# Patient Record
Sex: FEMALE | Race: WHITE | Employment: FULL TIME | URBAN - METROPOLITAN AREA
[De-identification: names, ages, dates, MRNs, and addresses within clinical notes are randomized per-mention and may not be internally consistent; named-entity substitution may affect disease eponyms.]

---

## 2017-01-11 ENCOUNTER — ALLIED HEALTH/NURSE VISIT (OUTPATIENT)
Dept: NURSING | Facility: CLINIC | Age: 22
End: 2017-01-11

## 2017-01-11 DIAGNOSIS — E66.01 MORBID OBESITY DUE TO EXCESS CALORIES (H): Primary | ICD-10-CM

## 2017-01-11 PROCEDURE — 99207 ZZC HEALTH COACHING, NO CHARGE: CPT

## 2017-01-11 NOTE — PROGRESS NOTES
January 11, 2017    [address] [address]  [phone]  Health Coaching Progress Note    Patient Name: Tricia Eubanks Date: January 11, 2017      Session Length: 30      DATA    PRM Master Survey Scores Reviewed: Yes    Core Healthy Days Survey:         MARIELLE Score (Last Two) 10/28/2016   MARIELLE Raw Score 29   Activation Score 52.9   MARIELLE Level 2       PHQ-2 Score 10/28/2016 8/7/2014   PHQ-2 Total Score Interpretation - Positive if 3 or more points; Administer PHQ-9 if positive 2 0       PHQ-9 SCORE 8/30/2016 9/27/2016   Total Score MyChart - 14 (Moderate depression)   Total Score 9 -       Treatment Objective(s) Addressed in This Session:  Target Behavior(s): diet/weight loss, smoking and disease management/lifestyle changes of working on dietary changes-being more aware of food choices, read labels-cutting back on carbs/adding more protein, eating healthier-more fresh food (vegetables) cutting back on sugary drinks/soda, drinking more water daily, getting back into routine of going to they gym again-mix of cardio and some machines/core work, accessing health related resources as needed, continuing to work with PCP/Endocrinologist and ask questions about health.    Current Stressors / Issues:  Work schedule-works long hours/never the same day-to-day/lots of driving, on the go a lot-tends to grab food that is convenient, trying to make healthier choices-realized most of what she eats is carb based, realizes it is essential to have healthy food available at home to be successful, cutting back on sugary drinks/more water, smoking habit, weight gain-wants to lose weight, concerned about long term health-diabetes runs in family.    What Patient Does Well:   1) Patient is motivated to change and is now attending gym 3 times per week-needs to get back on track  Previous Successes:   1) Pt. is seeing a loss of inches  around waist and reports she is down about 10lbs on her home scale-may have regained some following returning to old habits over holidays  Areas in Need of Improvement:   1) Diet-cutting back on carbs  2) Activity Level-making exercise a regular occurence  3) Asking questions about health-make sure to understand  Barriers to Change:   1) Work schedule is tricky-drives a ton for work/unpredictable hours  2) Less procrastination for meal planning  3) Time-making time for self  Reasons for Change:   1) Lose weight  2) Avoid other health conditions-Diabetes runs in family  3) Feels like its time to make a change  4) Feel and look better  Plan/Goal for the Next 4 Weeks:   GOAL #1: Cut back on carbs at meals and snacks/add more protein based foods instead  GOAL #1 Progress Toward Goal: 25% (Got a bit off track over holidays/getting back to healthy eating)  GOAL #2: Continue to cut back on sugary drinks and drink around 4-5 water bottles per day  GOAL #2 Progress Toward Goal: 50% (Still cutting back on the sugary drinks/only hitting about 2-3 bottles of water-hopes to improve)  GOAL #3: Continue exercising at the gym 3 times per week (carido/machines/core work) and add in 1 day of  activity from home (workout videos/walking)  GOAL #3 Progress Toward Goal: 25%  (Got a bit off track over holidays but working to get back into routine again!)  GOAL #4: Continue to work with Dr. Echeverria and ask questions about health/labs  GOAL #4 Progress Toward Goal: 100% Completed!    Intervention:  Motivational Interviewing    MI Intervention: Expressed Empathy/Understanding, Supported Autonomy, Collaboration, Evocation, Permission to raise concern or advise, Open-ended questions, Reflections: simple and complex, Rolled with resistance: Emphasized patient autonomy, Simple reflection, Complex reflection, Reframed sustain talk in the direction of change and Evoked patient agenda and Change talk (evoked)     Change Talk Expressed by the  Patient: Desire to change Ability to change Reasons to change Need to change Committment to change Activation    Provider Response to Change Talk: E - Evoked more info from patient about behavior change, A - Affirmed patient's thoughts, decisions, or attempts at behavior change, R - Reflected patient's change talk and S - Summarized patient's change talk statements    Assessment / Progress on Treatment Objective(s) / Homework:    Achieved / completed to satisfaction - MAINTENANCE (Working to maintain change, with risk of relapse); Intervened by continuing to positively reinforce healthy behavior choice   Minimal progress - PREPARATION (Decided to change - considering how); Intervened by negotiating a change plan and determining options / strategies for behavior change, identifying triggers, exploring social supports, and working towards setting a date to begin behavior change  No improvement - PREPARATION (Decided to change - considering how); Intervened by negotiating a change plan and determining options / strategies for behavior change, identifying triggers, exploring social supports, and working towards setting a date to begin behavior change         Plan: (Homework, other):  Patient was encouraged to continue to seek condition-related information and education, as well as schedule a follow up appointment with the Health  in 5 weeks. Patient has set self-identified goals and will monitor progress until the next appointment.  Scheduled our fourth coaching session for Friday February 17th at 10am.      Bertrand Gomez

## 2017-02-07 DIAGNOSIS — J20.9 ACUTE BRONCHITIS WITH SYMPTOMS > 10 DAYS: Primary | ICD-10-CM

## 2017-02-07 RX ORDER — FLUTICASONE PROPIONATE 110 UG/1
2 AEROSOL, METERED RESPIRATORY (INHALATION) 2 TIMES DAILY
Qty: 1 INHALER | Refills: 1 | Status: SHIPPED | OUTPATIENT
Start: 2017-02-07 | End: 2017-04-26

## 2017-02-07 NOTE — TELEPHONE ENCOUNTER
Ok to refill. Has needed this in the past after colds. Likely has some mild asthmatic component.  Carine Ross MD  Internal Medicine/Pediatrics

## 2017-02-07 NOTE — TELEPHONE ENCOUNTER
fluticasone (FLOVENT HFA) 110 MCG/ACT Inhaler - MEDICATION WAS ON DISCONTINUED LIST/HISTORY  Last Written Prescription Date: 11/4/16  Last Fill Quantity: 1 inhaler , # refills: 1    Last Office Visit with [facility], P or [facility] prescribing provider:  11/4/16   Future Office Visit:    Next 5 appointments (look out 90 days)     Feb 17, 2017 10:00 AM   Nurse Only with HEALTH  - REG[facility] [address] [place] [address] ([facility])    [address] [place] [address] [phone]                   Date of Last Asthma Action Plan Letter:   There are no preventive care reminders to display for this patient.   Asthma Control Test: No flowsheet data found.    Date of Last Spirometry Test:   No results found for this or any previous visit.

## 2017-02-17 ENCOUNTER — TELEPHONE (OUTPATIENT)
Dept: NURSING | Facility: CLINIC | Age: 22
End: 2017-02-17

## 2017-02-20 DIAGNOSIS — F33.1 MAJOR DEPRESSIVE DISORDER, RECURRENT EPISODE, MODERATE (H): ICD-10-CM

## 2017-02-20 NOTE — TELEPHONE ENCOUNTER
FLUoxetine (PROZAC) 20 MG capsule  Last Written Prescription Date: 3/24/16  Last Fill Quantity: 270, # refills: 1  Last Office Visit with G primary care provider:  11/4/16        Last PHQ-9 score on record=   PHQ-9 SCORE 9/27/2016   Total Score Sylviehart 14 (Moderate depression)   Total Score -

## 2017-02-22 NOTE — TELEPHONE ENCOUNTER
Routing refill request to provider for review/approval because:  PHQ 9 >4. Due for updated PHQ 9.   Left message for patient to call back to update this.   Dari Beverly, RN  Triage Nurse

## 2017-02-23 ENCOUNTER — TELEPHONE (OUTPATIENT)
Dept: NURSING | Facility: CLINIC | Age: 22
End: 2017-02-23

## 2017-02-28 NOTE — TELEPHONE ENCOUNTER
Will do 30 day supply.  Pt due for physical the end of march, will need follow up at that time.   Shannon Oscar MD

## 2017-02-28 NOTE — TELEPHONE ENCOUNTER
Spoke with patient and she will call us back later to schedule a physical. Last physical was on 3/24/16.

## 2017-03-06 ENCOUNTER — TELEPHONE (OUTPATIENT)
Dept: NURSING | Facility: CLINIC | Age: 22
End: 2017-03-06

## 2017-03-31 DIAGNOSIS — F33.1 MAJOR DEPRESSIVE DISORDER, RECURRENT EPISODE, MODERATE (H): ICD-10-CM

## 2017-04-05 NOTE — TELEPHONE ENCOUNTER
rx filled for 30 days. Agree [facility] appointment on 4/10. Please let know.    Carine Ross MD  Internal Medicine/Pediatrics

## 2017-04-05 NOTE — TELEPHONE ENCOUNTER
Patient has appointment on 04/10/17 with Dr. Ross.  Is able to wait until tomorrow to address this refill.  PHQ-Score of 12 today.  Does report suicidal thoughts at times, but has no specific plan.  Will call for help and proceed to [facility] if she feels suicidal.  Routing refill request to provider for review/approval because:  PHQ-9 score exceeds RN refill protocol  AAKASH Au RN

## 2017-04-06 ASSESSMENT — PATIENT HEALTH QUESTIONNAIRE - PHQ9: SUM OF ALL RESPONSES TO PHQ QUESTIONS 1-9: 12

## 2017-04-26 ENCOUNTER — OFFICE VISIT (OUTPATIENT)
Dept: PEDIATRICS | Facility: CLINIC | Age: 22
End: 2017-04-26
Payer: COMMERCIAL

## 2017-04-26 VITALS
SYSTOLIC BLOOD PRESSURE: 126 MMHG | OXYGEN SATURATION: 98 % | WEIGHT: 285.6 LBS | DIASTOLIC BLOOD PRESSURE: 72 MMHG | HEIGHT: 66 IN | TEMPERATURE: 98.7 F | BODY MASS INDEX: 45.9 KG/M2 | HEART RATE: 82 BPM

## 2017-04-26 DIAGNOSIS — F51.01 PRIMARY INSOMNIA: ICD-10-CM

## 2017-04-26 DIAGNOSIS — F33.1 MAJOR DEPRESSIVE DISORDER, RECURRENT EPISODE, MODERATE (H): Primary | ICD-10-CM

## 2017-04-26 DIAGNOSIS — E66.01 MORBID OBESITY DUE TO EXCESS CALORIES (H): ICD-10-CM

## 2017-04-26 DIAGNOSIS — F10.10 ALCOHOL ABUSE: ICD-10-CM

## 2017-04-26 DIAGNOSIS — F41.9 ANXIETY: ICD-10-CM

## 2017-04-26 PROCEDURE — 99214 OFFICE O/P EST MOD 30 MIN: CPT | Performed by: INTERNAL MEDICINE

## 2017-04-26 RX ORDER — TRAZODONE HYDROCHLORIDE 50 MG/1
50-100 TABLET, FILM COATED ORAL
Qty: 60 TABLET | Refills: 1 | Status: SHIPPED | OUTPATIENT
Start: 2017-04-26 | End: 2017-11-13

## 2017-04-26 RX ORDER — FLUOXETINE 40 MG/1
80 CAPSULE ORAL DAILY
Qty: 180 CAPSULE | Refills: 1 | Status: SHIPPED | OUTPATIENT
Start: 2017-04-26 | End: 2017-11-13

## 2017-04-26 ASSESSMENT — ANXIETY QUESTIONNAIRES
1. FEELING NERVOUS, ANXIOUS, OR ON EDGE: MORE THAN HALF THE DAYS
5. BEING SO RESTLESS THAT IT IS HARD TO SIT STILL: SEVERAL DAYS
6. BECOMING EASILY ANNOYED OR IRRITABLE: NEARLY EVERY DAY
3. WORRYING TOO MUCH ABOUT DIFFERENT THINGS: NEARLY EVERY DAY
2. NOT BEING ABLE TO STOP OR CONTROL WORRYING: NEARLY EVERY DAY
IF YOU CHECKED OFF ANY PROBLEMS ON THIS QUESTIONNAIRE, HOW DIFFICULT HAVE THESE PROBLEMS MADE IT FOR YOU TO DO YOUR WORK, TAKE CARE OF THINGS AT HOME, OR GET ALONG WITH OTHER PEOPLE: VERY DIFFICULT
7. FEELING AFRAID AS IF SOMETHING AWFUL MIGHT HAPPEN: SEVERAL DAYS
GAD7 TOTAL SCORE: 13

## 2017-04-26 ASSESSMENT — PATIENT HEALTH QUESTIONNAIRE - PHQ9: 5. POOR APPETITE OR OVEREATING: NOT AT ALL

## 2017-04-26 NOTE — PATIENT INSTRUCTIONS
1. Increase fluoxetine to 80 mg (2 capsules of new prescription) once a day  2. Try trazodone 1-2 pills at bedtime to help with sleep  3. Follow-up 1 month

## 2017-04-26 NOTE — NURSING NOTE
"Chief Complaint   Patient presents with     Depression       Initial /72 (BP Location: Right arm, Patient Position: Chair, Cuff Size: Adult Large)  Pulse 82  Temp 98.7  F (37.1  C) (Tympanic)  Ht 5' 6\" (1.676 m)  Wt 285 lb 9.6 oz (129.5 kg)  SpO2 98%  BMI 46.1 kg/m2 Estimated body mass index is 46.1 kg/(m^2) as calculated from the following:    Height as of this encounter: 5' 6\" (1.676 m).    Weight as of this encounter: 285 lb 9.6 oz (129.5 kg).  Medication Reconciliation: complete   Christina Ribeiro LPN      "

## 2017-04-26 NOTE — MR AVS SNAPSHOT
After Visit Summary   4/26/2017    Tricia Eubanks    MRN: [id number]           Patient Information     Date Of Birth          1995        Visit Information        Provider Department      4/26/2017 9:00 AM Carine Ross MD [facility]        Today's Diagnoses     Major depressive disorder, recurrent episode, moderate (H)    -  1    Anxiety        Primary insomnia        Alcohol abuse          Care Instructions    1. Increase fluoxetine to 80 mg (2 capsules of new prescription) once a day  2. Try trazodone 1-2 pills at bedtime to help with sleep  3. Follow-up 1 month        Follow-ups after your visit        Follow-up notes from your care team     Return in about 4 weeks (around 5/24/2017).      Your next 10 appointments already scheduled     May 08, 2017 11:30 AM CDT   Return Visit with Samantha Echeverria MD   [facility] ([facility])    [address] [place] [address] [address]  [facility] [phone]              Who to contact     If you have questions or need follow up information about today's clinic visit or your schedule please contact [facility] directly at [phone].  Normal or non-critical lab and imaging results will be communicated to you by MyChart, letter or phone within 4 business days after the clinic has received the results. If you do not hear from us within 7 days, please contact the clinic through Twitchhart or phone. If you have a critical or abnormal lab result, we will notify you by phone as soon as possible.  Submit refill requests through Frugalo or call your pharmacy and they will forward the refill request to us. Please allow 3 business days for your refill to be completed.          Additional Information About Your Visit        MyChart Information     Frugalo gives you secure access to your electronic health record. If you see a primary care provider, you can also send messages to your care team and  "make appointments. If you have questions, please call your primary care clinic.  If you do not have a primary care provider, please call [phone] and they will assist you.        Care EveryWhere ID     This is your Care EveryWhere ID. This could be used by other organizations to access your [place] medical records  [id number]        Your Vitals Were     Pulse Temperature Height Pulse Oximetry BMI (Body Mass Index)       82 98.7  F (37.1  C) (Tympanic) 5' 6\" (1.676 m) 98% 46.1 kg/m2        Blood Pressure from Last 3 Encounters:   04/26/17 126/72   12/05/16 136/80   11/04/16 132/84    Weight from Last 3 Encounters:   04/26/17 285 lb 9.6 oz (129.5 kg)   12/05/16 283 lb 3.2 oz (128.5 kg)   11/04/16 288 lb 1.6 oz (130.7 kg)              Today, you had the following     No orders found for display         Today's Medication Changes          These changes are accurate as of: 4/26/17  9:36 AM.  If you have any questions, ask your nurse or doctor.               Start taking these medicines.        Dose/Directions    traZODone 50 MG tablet   Commonly known as:  DESYREL   Used for:  Primary insomnia   Started by:  Carine Ross MD        Dose:   mg   Take 1-2 tablets ( mg) by mouth nightly as needed for sleep   Quantity:  60 tablet   Refills:  1         These medicines have changed or have updated prescriptions.        Dose/Directions    FLUoxetine 40 MG capsule   Commonly known as:  PROzac   This may have changed:  See the new instructions.   Used for:  Major depressive disorder, recurrent episode, moderate (H), Anxiety   Changed by:  Carine Ross MD        Dose:  80 mg   Take 2 capsules (80 mg) by mouth daily   Quantity:  180 capsule   Refills:  1            Where to get your medicines      These medications were sent to [place] [address] [place] [facility] [address] [facility]  [address] [facility] [address]     Phone:  [phone]     FLUoxetine 40 MG capsule    traZODone 50 MG " tablet                Primary Care Provider Office Phone # Fax #    Carine Ross [phone]       [address] [place] [address]        Thank you!     Thank you for choosing [facility]  for your care. Our goal is always to provide you with excellent care. Hearing back from our patients is one way we can continue to improve our services. Please take a few minutes to complete the written survey that you may receive in the mail after your visit with us. Thank you!             Your Updated Medication List - Protect others around you: Learn how to safely use, store and throw away your medicines at www.disposemymeds.org.          This list is accurate as of: 4/26/17  9:36 AM.  Always use your most recent med list.                   Brand Name Dispense Instructions for use    Fish Oil 1200 MG Caps      Take 2 capsules by mouth daily       FLUoxetine 40 MG capsule    PROzac    180 capsule    Take 2 capsules (80 mg) by mouth daily       multivitamin, therapeutic Tabs tablet      Take 1 tablet by mouth daily       phentermine 37.5 MG tablet    ADIPEX-P    30 tablet    Take 1 tablet (37.5 mg) by mouth every morning (before breakfast)       traZODone 50 MG tablet    DESYREL    60 tablet    Take 1-2 tablets ( mg) by mouth nightly as needed for sleep

## 2017-04-26 NOTE — PROGRESS NOTES
SUBJECTIVE:                                                    Tricia Eubanks is a 22 year old female who presents to clinic today for the following health issues:    Depression Followup    Status since last visit: Worsened little    See PHQ-9 for current symptoms.  Other associated symptoms: thought about self injury without actions    Complicating factors:   Significant life event:  No   Current substance abuse:  Alcohol - daily to help with falling asleep. 5 drinks - whiskey.  Anxiety or Panic symptoms:  Yes-  Anxiety    Typically worse in the spring and improves again in summer. Has had some thoughts about cutting, but has not acted on the impulse and no suicidal ideation.    ETOH: drinking about 5 drinks of whiskey each night to fall asleep. Has some tremulousness and anxiety if tries to stop completely. Ok if cuts back to 1-2 drinks. Does not drink other times during the day. Not interfering with work.    Insomnia: difficulty with falling asleep. Currently using etoh to help with sleep as above, but would like to stop doing this. Has used melatonin in the past and has not been helpful. Has not done other sleep medicines. Anxiety gets worse if unable to sleep. Drinks 1 pop/day with caffeine - around 2 pm. Exercises 2-3 days/week - in the am. Has snoring at night. Feels rested in the morning.     PHQ-9  English PHQ-9   Any Language            Amount of exercise or physical activity: 2-3 days/week for an average of 15-30 minutes    Problems taking medications regularly: No    Medication side effects: none    Diet: regular (no restrictions)    Reviewed and updated as needed this visit by clinical staff  Tobacco  Allergies  Meds  Problems  Med Hx  Surg Hx  Fam Hx  Soc Hx        Reviewed and updated as needed this visit by Provider  Allergies  Meds  Problems         -------------------------------------    Problem list and histories reviewed & adjusted, as indicated.  Additional history: as  "documented    ROS:  Constitutional, HEENT, cardiovascular, pulmonary, gi and gu systems are negative, except as otherwise noted.    Problem list, Medication list, Allergies, and Medical/Social/Surgical histories reviewed in EPIC and updated as appropriate.    OBJECTIVE:                                                    /72 (BP Location: Right arm, Patient Position: Chair, Cuff Size: Adult Large)  Pulse 82  Temp 98.7  F (37.1  C) (Tympanic)  Ht 5' 6\" (1.676 m)  Wt 285 lb 9.6 oz (129.5 kg)  SpO2 98%  BMI 46.1 kg/m2   Body mass index is 46.1 kg/(m^2).  General Appearance: obese, alert and no distress  Eyes:   no discharge, erythema.  Normal pupils.  Respiratory: lungs clear to auscultation - no rales, rhonchi or wheezes.  Cardiovascular: regular rate and rhythm, normal S1 S2, no S3 or S4 and no murmur, click or rub.  No peripheral edema.  Skin: no rashes or lesions.  Well perfused and normal turgor.  Psychiatric: affect flat    Diagnostic Test Results:  none      ASSESSMENT/PLAN:                                                      (F33.1) Major depressive disorder, recurrent episode, moderate (H)  (primary encounter diagnosis)  (F41.9) Anxiety  Comment: not well controlled. Thoughts of cutting, but patient willing to contract for safety  Plan: FLUoxetine (PROZAC) 40 MG capsule  - will try increasing fluoxetine to 80 mg daily     (F51.01) Primary insomnia  Comment: discussed ETOH likely worsening insomnia  Plan: traZODone (DESYREL) 50 MG tablet  - discussed cutting back on ETOH and stopping  - trial of trazodone  mg before bedtime - discussed possible side effects. Discussed not using if drinking  - discussed avoiding caffeine, having consistent bedtimes etc    (F10.10) Alcohol abuse  Comment: mild withdrawal symptoms if stops completely  Plan:   - discussed weaning down to 1-2 drinks/night and then try stopping    (E66.01) Morbid obesity due to excess calories (H)  Comment: BMI 46  Plan:   - " "discussed calories in ETOH and by cutting out ETOH will likely lose weight  - discussed diet/exercise    BMI:   Estimated body mass index is 46.1 kg/(m^2) as calculated from the following:    Height as of this encounter: 5' 6\" (1.676 m).    Weight as of this encounter: 285 lb 9.6 oz (129.5 kg).   Weight management plan: Discussed healthy diet and exercise guidelines and patient will follow up in 12 months in clinic to re-evaluate.      Follow up with Provider - 1 month     [facility]            "

## 2017-04-27 ASSESSMENT — ANXIETY QUESTIONNAIRES: GAD7 TOTAL SCORE: 13

## 2017-04-27 ASSESSMENT — PATIENT HEALTH QUESTIONNAIRE - PHQ9: SUM OF ALL RESPONSES TO PHQ QUESTIONS 1-9: 17

## 2017-05-08 ENCOUNTER — OFFICE VISIT (OUTPATIENT)
Dept: ENDOCRINOLOGY | Facility: CLINIC | Age: 22
End: 2017-05-08
Payer: COMMERCIAL

## 2017-05-08 VITALS
BODY MASS INDEX: 45.8 KG/M2 | WEIGHT: 285 LBS | HEIGHT: 66 IN | HEART RATE: 81 BPM | SYSTOLIC BLOOD PRESSURE: 120 MMHG | DIASTOLIC BLOOD PRESSURE: 68 MMHG

## 2017-05-08 DIAGNOSIS — E66.01 MORBID OBESITY DUE TO EXCESS CALORIES (H): Primary | ICD-10-CM

## 2017-05-08 DIAGNOSIS — F17.200 SMOKING: ICD-10-CM

## 2017-05-08 PROCEDURE — 99214 OFFICE O/P EST MOD 30 MIN: CPT | Performed by: INTERNAL MEDICINE

## 2017-05-08 RX ORDER — PHENTERMINE HYDROCHLORIDE 37.5 MG/1
37.5 TABLET ORAL
Qty: 30 TABLET | Refills: 3 | Status: SHIPPED | OUTPATIENT
Start: 2017-05-08 | End: 2017-10-12

## 2017-05-08 NOTE — MR AVS SNAPSHOT
After Visit Summary   2017    Tricia Eubanks    MRN: [id number]           Patient Information     Date Of Birth          1995        Visit Information        Provider Department      2017 11:30 AM Samantha Echeverria MD [facility]        Today's Diagnoses     Morbid obesity due to excess calories (H)          Care Instructions    [facility]   Dr Echeverria, Endocrinology Department      [facility]   [address] [facility] [address]  Appointment [phone]  Fax: [phone]   Monday and Tuesday         [facility]   [address] [place] [address]  Appointment [phone]  Fax: [phone]  Wednesday and Thursday           Continue phentermine 37.5 mg/day  Follow up in 3 months  The patient is advised to Make better food choices: reduce carbs, Reduce portion size, weight loss and exercise 3-4 times a week.                Follow-ups after your visit        Who to contact     If you have questions or need follow up information about today's clinic visit or your schedule please contact [facility] directly at [phone].  Normal or non-critical lab and imaging results will be communicated to you by MyChart, letter or phone within 4 business days after the clinic has received the results. If you do not hear from us within 7 days, please contact the clinic through Inspherionhart or phone. If you have a critical or abnormal lab result, we will notify you by phone as soon as possible.  Submit refill requests through Magiq or call your pharmacy and they will forward the refill request to us. Please allow 3 business days for your refill to be completed.          Additional Information About Your Visit        MyChart Information     Magiq gives you secure access to your electronic health record. If you see a primary care provider, you can also  "send messages to your care team and make appointments. If you have questions, please call your primary care clinic.  If you do not have a primary care provider, please call [phone] and they will assist you.        Care EveryWhere ID     This is your Care EveryWhere ID. This could be used by other organizations to access your [place] medical records  [id number]        Your Vitals Were     Pulse Height BMI (Body Mass Index)             81 1.676 m (5' 6\") 46 kg/m2          Blood Pressure from Last 3 Encounters:   05/08/17 120/68   04/26/17 126/72   12/05/16 136/80    Weight from Last 3 Encounters:   05/08/17 129.3 kg (285 lb)   04/26/17 129.5 kg (285 lb 9.6 oz)   12/05/16 128.5 kg (283 lb 3.2 oz)              Today, you had the following     No orders found for display         Where to get your medicines      Some of these will need a paper prescription and others can be bought over the counter.  Ask your nurse if you have questions.     Bring a paper prescription for each of these medications     phentermine 37.5 MG tablet          Primary Care Provider Office Phone # Fax #    Carine Ross [phone]       [address] [place] [address]        Thank you!     Thank you for choosing [facility]  for your care. Our goal is always to provide you with excellent care. Hearing back from our patients is one way we can continue to improve our services. Please take a few minutes to complete the written survey that you may receive in the mail after your visit with us. Thank you!             Your Updated Medication List - Protect others around you: Learn how to safely use, store and throw away your medicines at www.disposemymeds.org.          This list is accurate as of: 5/8/17 11:47 AM.  Always use your most recent med list.                   Brand Name Dispense Instructions for use    Fish Oil 1200 MG Caps      Take 2 capsules by mouth daily    "    FLUoxetine 40 MG capsule    PROzac    180 capsule    Take 2 capsules (80 mg) by mouth daily       multivitamin, therapeutic Tabs tablet      Take 1 tablet by mouth daily       phentermine 37.5 MG tablet    ADIPEX-P    30 tablet    Take 1 tablet (37.5 mg) by mouth every morning (before breakfast)       traZODone 50 MG tablet    DESYREL    60 tablet    Take 1-2 tablets ( mg) by mouth nightly as needed for sleep

## 2017-05-08 NOTE — PATIENT INSTRUCTIONS
[facility]   Dr Echeverria, Endocrinology Department      [facility]   [address] [facility] [address]  Appointment [phone]  Fax: [phone]   Monday and Tuesday         [place] [address] [place] [address]  Appointment [phone]  Fax: [phone]  Wednesday and Thursday           Continue phentermine 37.5 mg/day  Follow up in 3 months  The patient is advised to Make better food choices: reduce carbs, Reduce portion size, weight loss and exercise 3-4 times a week.

## 2017-05-08 NOTE — PROGRESS NOTES
Name: Tricia Eubanks  Seen at the request of No ref. provider found for obesity.  HPI:  Tricia Eubanks is a 21 year old female who presents for the evaluation of obestiy.    Body mass index is 46 kg/(m^2).     ENDO VITALS-[facility] Weight   10/17/2016 131.09 kg   8/2/2016 129.729 kg   3/24/2016    3/24/2016 121.972 kg   1/13/2016 119.614 kg     Started to gain weight 2014. Around the same time was started on Prozac for depression.  Gained about 70-80 lbs in last 2 years.  Job is stressful. Graduated last year.  Was not exercising last year. Started exercise X 1 year.  Not counting calories. + alcohol use.Strong FH of obesity.  Current everyday smoker.    Thyroid labs normal. 24 hr urine collection with upper normal limit of cortisol.  S/p DSt which was normal.  Normal A1c.  She was started on phentermine in October 2016.  She took it for a while but ran out of medication about 1.5 months back.  Initially lost weight on phentermine and after that plateaued.  Was tolerating phentermine well.    Started pehentermine 10/20/16.  Pre start: 289 lbs  12/16- 283 lbs  5/2017- 285 ( stopped phentermine 1 month back)      Menses: has IUD X 5 years  Diarrhea/Constipation:no  Changes in Hair or Skin:No  Diabetes:No  Sleep: Ok  Sleep Apnea/Snores:Yes: snores at night. no sleep study done  Hypertension:No  Hyperlipidemia:No  Hirsutism:No  Easy Brusing:Yes: thinks that she bruises easily.   Use of Steroids:No  Family history of Obesity:Yes: mother, father: obese. 1 sister: overweight, 1 sister: obese. grandparensts: obese  Diet: tries to watch she is eating. Alcohol use 3-4 times/week. 5-6 drinks each time. Trying to cut down  Exercise: 2-3 times/week going to gym. Doing elliptical and weights.    PMH/PSH:  History reviewed. No pertinent past medical history.  History reviewed. No pertinent surgical history.  Family Hx:  Family History   Problem Relation Age of Onset     HEART DISEASE Maternal Grandfather      DIABETES  "Maternal Grandfather      DIABETES Father      Hypertension Father      DIABETES Paternal Grandfather      Hypertension Paternal Grandfather      Hypertension Paternal Uncle              Social Hx:  Social History     Social History     Marital status: Single     Spouse name: N/A     Number of children: N/A     Years of education: N/A     Occupational History     Not on file.     Social History Main Topics     Smoking status: Light Tobacco Smoker     Types: Cigarettes     Smokeless tobacco: Never Used      Comment: 3-5 cpd     Alcohol use No      Comment: once every 2 weeks     Drug use: No     Sexual activity: Yes     Partners: Male     Birth control/ protection: IUD     Other Topics Concern     Not on file     Social History Narrative          MEDICATIONS:  has a current medication list which includes the following prescription(s): phentermine, fluoxetine, trazodone, multivitamin, therapeutic, and fish oil.    ROS     ROS: 10 point ROS neg other than the symptoms noted above in the HPI.    Physical Exam   VS: /68 (BP Location: Right arm, Patient Position: Chair, Cuff Size: Adult Large)  Pulse 81  Ht 1.676 m (5' 6\")  Wt 129.3 kg (285 lb)  BMI 46 kg/m2  GENERAL: AXOX3, NAD, well dressed, answering questions appropriately, appears stated age.  HEENT: No exopthalmous, no proptosis, EOMI, no lig lag, no retraction  NECK: Thyroid normal in size, non tender, no nodules were palpated.  CV: RRR, no rubs, gallops, no murmurs  LUNGS: CTAB, no wheezes, rales, or ronchi  ABDOMEN: +BS  NEUROLOGY: CN grossly intact, + DTR upper and lower extremity, no tremors  MSK: grossly intact  SKIN: no rashes, no lesions    LABS:  Last Basic Metabolic Panel:  Lab Results   Component Value Date     08/02/2016      Lab Results   Component Value Date    POTASSIUM 4.2 08/02/2016     Lab Results   Component Value Date    CHLORIDE 102 08/02/2016     Lab Results   Component Value Date    JORGE 8.7 08/02/2016     Lab Results "   Component Value Date    CO2 27 08/02/2016     Lab Results   Component Value Date    BUN 10 08/02/2016     Lab Results   Component Value Date    CR 0.59 08/02/2016     Lab Results   Component Value Date    GLC 97 08/02/2016         ENDO THYROID LABS-[facility] Latest Ref Rng 8/2/2016 3/24/2016   TSH 0.40 - 4.00 mU/L 1.70 1.26   T4 FREE 0.76 - 1.46 ng/dL 0.96      A1C      5.4   3/24/2016      Results for JANICE PINTO (MRN [id number])    Ref. Range 8/6/2016 17:50   Cortisol Free Duration Urine Latest Units: h 24...   Cortisol Free Urine Unknown 42.3   Cortisol Free Urine Intrepretation Unknown SEE NOTE...   Cortisol Free Urine Random Unknown 17.80   Cortisol ug/g creatinine Unknown 31.79       All pertinent notes, labs, and images personally reviewed by me.     A/P  Ms.Kristen Elizabeth Pinto is a 21 year old here for the evaluation of obestiy:    1. Obesity- Body mass index is 46 kg/(m^2).  Thyroid labs normal. 24 hr urine collection with upper normal limit of cortisol. S/p DST which was normal. EKG OK.  -- Phentermine 1.5 months.  She ran out of medication at that time.  Before that she was on phentermine 37.5 mg per day.  Was tolerating going okay.  Lost some weight on phentermine in past.  -- Restart phentermine 37.5 mg per day  -- Follow up in three months  -- The patient is advised to Make better food choices: reduce carbs, Reduce portion size, weight loss and exercise 3-4 times a week.    -- Advised to cut down on alcohol intake and smoking cessation.  -- I also discussed bariatric surgery briefly with her.  She would like to focus on medication weight loss at this time and will consider bariatric surgery is no success with medical weight loss.  I informed her about patient information seminars offered at bariatric surgery Center at [facility].    More than 50% of the time spent with Ms. Pinto on counseling / coordinating her care.  Total appointment time was 25 minutes.      Follow-up:  3 months    Samantha  MD Juwan  Endocrinology   [facility]

## 2017-08-09 DIAGNOSIS — F33.1 MAJOR DEPRESSIVE DISORDER, RECURRENT EPISODE, MODERATE (H): ICD-10-CM

## 2017-08-09 DIAGNOSIS — F41.9 ANXIETY: ICD-10-CM

## 2017-08-10 NOTE — TELEPHONE ENCOUNTER
Could update her PHQ 9-left message for her to call us back.  She is not active on my chart.     Dari Beverly, RN  Triage Nurse

## 2017-08-25 ENCOUNTER — OFFICE VISIT (OUTPATIENT)
Dept: PEDIATRICS | Facility: CLINIC | Age: 22
End: 2017-08-25
Payer: COMMERCIAL

## 2017-08-25 VITALS
DIASTOLIC BLOOD PRESSURE: 70 MMHG | TEMPERATURE: 98 F | BODY MASS INDEX: 45.87 KG/M2 | RESPIRATION RATE: 20 BRPM | WEIGHT: 284.2 LBS | HEART RATE: 80 BPM | SYSTOLIC BLOOD PRESSURE: 114 MMHG

## 2017-08-25 DIAGNOSIS — R11.0 NAUSEA: ICD-10-CM

## 2017-08-25 DIAGNOSIS — R68.89 FLU-LIKE SYMPTOMS: ICD-10-CM

## 2017-08-25 DIAGNOSIS — A08.4 VIRAL GASTROENTERITIS: Primary | ICD-10-CM

## 2017-08-25 LAB
BASOPHILS # BLD AUTO: 0 10E9/L (ref 0–0.2)
BASOPHILS NFR BLD AUTO: 0 %
DIFFERENTIAL METHOD BLD: NORMAL
EOSINOPHIL # BLD AUTO: 0.2 10E9/L (ref 0–0.7)
EOSINOPHIL NFR BLD AUTO: 2.2 %
ERYTHROCYTE [DISTWIDTH] IN BLOOD BY AUTOMATED COUNT: 12 % (ref 10–15)
HCT VFR BLD AUTO: 39.6 % (ref 35–47)
HGB BLD-MCNC: 13.2 G/DL (ref 11.7–15.7)
LYMPHOCYTES # BLD AUTO: 2.1 10E9/L (ref 0.8–5.3)
LYMPHOCYTES NFR BLD AUTO: 25.8 %
MCH RBC QN AUTO: 31.7 PG (ref 26.5–33)
MCHC RBC AUTO-ENTMCNC: 33.3 G/DL (ref 31.5–36.5)
MCV RBC AUTO: 95 FL (ref 78–100)
MONOCYTES # BLD AUTO: 0.8 10E9/L (ref 0–1.3)
MONOCYTES NFR BLD AUTO: 10.2 %
NEUTROPHILS # BLD AUTO: 5 10E9/L (ref 1.6–8.3)
NEUTROPHILS NFR BLD AUTO: 61.8 %
PLATELET # BLD AUTO: 318 10E9/L (ref 150–450)
RBC # BLD AUTO: 4.16 10E12/L (ref 3.8–5.2)
WBC # BLD AUTO: 8.1 10E9/L (ref 4–11)

## 2017-08-25 PROCEDURE — 80053 COMPREHEN METABOLIC PANEL: CPT | Performed by: INTERNAL MEDICINE

## 2017-08-25 PROCEDURE — 36415 COLL VENOUS BLD VENIPUNCTURE: CPT | Performed by: INTERNAL MEDICINE

## 2017-08-25 PROCEDURE — 86618 LYME DISEASE ANTIBODY: CPT | Performed by: INTERNAL MEDICINE

## 2017-08-25 PROCEDURE — 99214 OFFICE O/P EST MOD 30 MIN: CPT | Performed by: INTERNAL MEDICINE

## 2017-08-25 PROCEDURE — 85025 COMPLETE CBC W/AUTO DIFF WBC: CPT | Performed by: INTERNAL MEDICINE

## 2017-08-25 RX ORDER — FLUOXETINE 40 MG/1
CAPSULE ORAL
Qty: 180 CAPSULE | Refills: 1 | OUTPATIENT
Start: 2017-08-25

## 2017-08-25 RX ORDER — ONDANSETRON 4 MG/1
4 TABLET, FILM COATED ORAL EVERY 8 HOURS PRN
Qty: 18 TABLET | Refills: 1 | Status: SHIPPED | OUTPATIENT
Start: 2017-08-25 | End: 2018-05-24

## 2017-08-25 ASSESSMENT — PATIENT HEALTH QUESTIONNAIRE - PHQ9
SUM OF ALL RESPONSES TO PHQ QUESTIONS 1-9: 15
5. POOR APPETITE OR OVEREATING: SEVERAL DAYS

## 2017-08-25 ASSESSMENT — ANXIETY QUESTIONNAIRES
2. NOT BEING ABLE TO STOP OR CONTROL WORRYING: MORE THAN HALF THE DAYS
1. FEELING NERVOUS, ANXIOUS, OR ON EDGE: MORE THAN HALF THE DAYS
GAD7 TOTAL SCORE: 10
6. BECOMING EASILY ANNOYED OR IRRITABLE: MORE THAN HALF THE DAYS
7. FEELING AFRAID AS IF SOMETHING AWFUL MIGHT HAPPEN: NOT AT ALL
5. BEING SO RESTLESS THAT IT IS HARD TO SIT STILL: NOT AT ALL
IF YOU CHECKED OFF ANY PROBLEMS ON THIS QUESTIONNAIRE, HOW DIFFICULT HAVE THESE PROBLEMS MADE IT FOR YOU TO DO YOUR WORK, TAKE CARE OF THINGS AT HOME, OR GET ALONG WITH OTHER PEOPLE: SOMEWHAT DIFFICULT
3. WORRYING TOO MUCH ABOUT DIFFERENT THINGS: NEARLY EVERY DAY

## 2017-08-25 NOTE — MR AVS SNAPSHOT
"              After Visit Summary   8/25/2017    Tricia Eubanks    MRN: [id number]           Patient Information     Date Of Birth          1995        Visit Information        Provider Department      8/25/2017 1:20 PM Wiliam Brown MD [facility]        Today's Diagnoses     Viral gastroenteritis    -  1    Flu-like symptoms        Nausea          Care Instructions      Diarrhea (Adult, Viral)    Diarrhea caused by a virus is often called viral gastroenteritis. Many people call it the \"stomach flu,\" but it has nothing to do with influenza. The virus that causes diarrhea affects the stomach and intestinal tract and usually lasts from 2 to 7 days. Diarrhea is the passing of loose, watery stools 3 or more times a day.  Symptoms  Along with diarrhea, you may have these symptoms:    Abdominal pain and cramping    Nausea and vomiting    Loss of bowel control    Fever and chills    Bloody stools  The danger from repeated diarrhea is dehydration. Dehydration is the loss of too much water and other fluids from the body without taking in enough to replace what is lost.  Antibiotics are not effective in this illness, but there are a number of things you can do at home that will help.  Home care  Follow these home care measures:    If symptoms are severe, rest at home for the next 24 hours or until you are feeling better.    Wash your hands with soap and water or alcohol-based  to prevent the spread of infection. Wash your hands after touching anyone who is sick.    Wash your hands after using the toilet and before meals. Clean the toilet after each use.  Food preparation:    People with diarrhea should not prepare food for others. When preparing foods, wash your hands after touching anyone who is sick.    Wash your hands after using cutting boards, countertops, and knives that have been in contact with raw food.    Keep uncooked meats away from cooked and ready-to-eat " foods.  Medications:    You may use acetaminophen or NSAIDS such as ibuprofen or naproxen to control fever unless another medicine was prescribed.  If you have chronic liver or kidney disease or ever had a stomach ulcer or gastrointestinal bleeding, talk with your healthcare provider before using these medicines. Aspirin should never be used in anyone under 18 years of age who is ill with a fever. It may cause severe liver damage. Don't use NSAID medicines if you are already taking one for another condition (like arthritis) or are on aspirin (such as for heart disease or after a stroke).    Anti-diarrhea medicine should be taken for this condition only if advised by your healthcare provider. Sometimes anti-diarrhea medicine can make your condition worse.  Diet:    Water and clear liquids are important so you do not get dehydrated. Drink small amounts at a time, do not guzzle it down. If you are very dehydrated, sports drinks aren't a good choice. They have too much sugar and not enough electrolytes. In this case, commercially available products called oral rehydration solutions are best.    Caffeine, tobacco, and alcohol can make the diarrhea, cramping, and pain worse.    Do not force yourself to eat, especially if you have cramping, vomiting, or diarrhea. Do not eat large amounts at a time, even if you are hungry. It may make you feel worse.    If you eat, avoid fatty, greasy, spicy, or fried foods.    No dairy products, as they can make diarrhea worse.  During the first 24 Hours (the first full day) follow the diet below:    Beverages: Water, clear liquids, soft drinks without caffeine; ginger ale, mineral water (plain or flavored), decaffeinated tea and coffee.    Soups: Clear broth, consommé and bouillon    Desserts: Plain gelatin, popsicles and fruit juice bars  During the next 24 hours (the second day) you may add the following to the above if you have improved:    Hot cereal, plain toast, bread, rolls,  crackers    Plain noodles, rice, mashed potatoes, chicken noodle or rice soup    Unsweetened canned fruit (avoid pineapple), bananas    Limit fat intake to less than 15 grams per day by avoiding margarine, butter, oils, mayonnaise, sauces, gravies, fried foods, peanut butter, meat, poultry and fish.    Limit fiber; avoid raw or cooked vegetables, fresh fruits (except bananas) and bran cereals.    Limit caffeine and chocolate. No spices or seasonings except salt.  During the next 24 hours    Gradually resume a normal diet, as you feel better and your symptoms improve.    If at any time the diarrhea or cramping gets worse, go back to the simpler diet (above) or to clear liquids.  Follow-up care  Follow up with your healthcare provider, or as advised. Call if you are not improving within 24 hours or if the diarrhea lasts more than one week. If a stool (diarrhea) sample was taken, you may call in 2 days (or as directed) for the results.  Call 911  Call 911 if any of these occur:    Shortness of breath    Chest pain    Drowsiness, confusion, stiff neck, or seizure  When to seek medical care  Get prompt medical attention if any of the following occur:    Increasing abdominal pain or constant lower right abdominal pain    Continued vomiting (unable to keep liquids down)    Frequent diarrhea (more than 5 times a day)    Blood in vomit or stool (black or red color)    Reduced oral intake    Dark urine, reduced urine output    Weakness, dizziness    Drowsiness    Fever of 100.4 F (38 C) oral or higher, not better with fever medicine    New rash  Call 911  Call emergency services if any of the following occur:    Trouble breathing    Confused    Severe drowsiness or trouble awakening    Fainting or loss of consciousness    Rapid heart rate    Seizure    Stiff neck  Date Last Reviewed: 11/16/2015 2000-2017 [employer]. [address] [place] [address], [place] [address]. All rights reserved. This information is not  intended as a substitute for professional medical care. Always follow your healthcare professional's instructions.                Follow-ups after your visit        Who to contact     If you have questions or need follow up information about today's clinic visit or your schedule please contact [facility] [place] directly at [phone].  Normal or non-critical lab and imaging results will be communicated to you by MyChart, letter or phone within 4 business days after the clinic has received the results. If you do not hear from us within 7 days, please contact the clinic through [employer] or phone. If you have a critical or abnormal lab result, we will notify you by phone as soon as possible.  Submit refill requests through [email or web address] or call your pharmacy and they will forward the refill request to us. Please allow 3 business days for your refill to be completed.          Additional Information About Your Visit        MyChart Information     [email or web address] gives you secure access to your electronic health record. If you see a primary care provider, you can also send messages to your care team and make appointments. If you have questions, please call your primary care clinic.  If you do not have a primary care provider, please call [phone] and they will assist you.        Care EveryWhere ID     This is your Care EveryWhere ID. This could be used by other organizations to access your [place] medical records  [id number]        Your Vitals Were     Pulse Temperature Respirations Last Period BMI (Body Mass Index)       80 98  F (36.7  C) (Oral) 20 08/25/2017 45.87 kg/m2        Blood Pressure from Last 3 Encounters:   08/25/17 114/70   05/08/17 120/68   04/26/17 126/72    Weight from Last 3 Encounters:   08/25/17 284 lb 3.2 oz (128.9 kg)   05/08/17 285 lb (129.3 kg)   04/26/17 285 lb 9.6 oz (129.5 kg)              We Performed the Following     CBC with platelets differential     Comprehensive metabolic panel     Lyme  Disease [place] with reflex to WB Serum          Today's Medication Changes          These changes are accurate as of: 8/25/17  1:52 PM.  If you have any questions, ask your nurse or doctor.               Start taking these medicines.        Dose/Directions    ondansetron 4 MG tablet   Commonly known as:  ZOFRAN   Used for:  Flu-like symptoms, Nausea   Started by:  Wiliam Brown MD        Dose:  4 mg   Take 1 tablet (4 mg) by mouth every 8 hours as needed for nausea   Quantity:  18 tablet   Refills:  1            Where to get your medicines      These medications were sent to [place] [address] [facility] [place] - [address]  [address], [place] [address]     Phone:  [phone]     ondansetron 4 MG tablet                Primary Care Provider Office Phone # Fax #    Carine Ross [phone] [address] [place] [address]        Equal Access to Services     [facility]: Hadii vonnie salomon hadasho Soomaali, waaxda luqadaha, qaybta kaalmada adeegyada, viviana herman haymatilde betts . So [facility] [phone].    ATENCIÓN: Si habla español, tiene a yap disposición servicios gratuitos de asistencia lingüística. Llame al [phone].    We comply with applicable federal civil rights laws and [place] laws. We do not discriminate on the basis of race, color, national origin, age, disability sex, sexual orientation or gender identity.            Thank you!     Thank you for choosing [facility]  for your care. Our goal is always to provide you with excellent care. Hearing back from our patients is one way we can continue to improve our services. Please take a few minutes to complete the written survey that you may receive in the mail after your visit with us. Thank you!             Your Updated Medication List - Protect others around you: Learn how to safely use, store and throw away your medicines at www.disposemymeds.org.          This list is  accurate as of: 8/25/17  1:52 PM.  Always use your most recent med list.                   Brand Name Dispense Instructions for use Diagnosis    Fish Oil 1200 MG Caps      Take 2 capsules by mouth daily        FLUoxetine 40 MG capsule    PROzac    180 capsule    Take 2 capsules (80 mg) by mouth daily    Major depressive disorder, recurrent episode, moderate (H), Anxiety       multivitamin, therapeutic Tabs tablet      Take 1 tablet by mouth daily        ondansetron 4 MG tablet    ZOFRAN    18 tablet    Take 1 tablet (4 mg) by mouth every 8 hours as needed for nausea    Flu-like symptoms, Nausea       phentermine 37.5 MG tablet    ADIPEX-P    30 tablet    Take 1 tablet (37.5 mg) by mouth every morning (before breakfast)    Morbid obesity due to excess calories (H)       traZODone 50 MG tablet    DESYREL    60 tablet    Take 1-2 tablets ( mg) by mouth nightly as needed for sleep    Primary insomnia

## 2017-08-25 NOTE — PROGRESS NOTES
"SUBJECTIVE:                                                    Tricia Eubanks is a 22 year old female who presents to clinic today for the following health issues:    Bloating(gassy)      Duration: 8 days    Description (location/character/radiation): abd cramping, fatigue and diarrhea    Intensity:  moderate    Accompanying signs and symptoms: nausea    History (similar episodes/previous evaluation): NA    Precipitating or alleviating factors: None    Therapies tried and outcome: Brat diet for 2 days now seem to be somewhat effective     patient here for above, thinks she may have the stomache flus, is having 5-6 stools a day., liquid. no blood. color of light brown. is using a BLAND, has had some nausea and did force herself to vomit, feels like it gets \"backed up\" but this is infrequent, last time was 2 nights ago. no fevers that she is aware of, felt warm yesturday. no one with similar symptoms. No other concerns or complaints today. patient's mother worried it could be lyme as patient has had a tick bite in the past (> a few weeks ago). no fevers. no rash. No other concerns or complaints today.     Problem list and histories reviewed & adjusted, as indicated.  Additional history: as documented  Patient Active Problem List    Diagnosis Date Noted     Anxiety 04/26/2017     Priority: Medium     Morbid obesity due to excess calories (H) 08/02/2016     Priority: Medium     Body mass index is 46.85 kg/(m^2).         Abnormal weight gain 08/02/2016     Priority: Medium     Major depressive disorder, recurrent episode, moderate (H) 11/17/2015     Priority: Medium     Eczema, unspecified eczema 11/17/2015     Priority: Medium     Smoking 11/12/2014     Priority: Medium     Hemoptysis 11/12/2014     Priority: Medium     IUD (intrauterine device) in place 08/07/2014     Priority: Medium     Placed 2/2011       Alcohol abuse 08/07/2014     Priority: Medium     Social History   Substance Use Topics     Smoking status: " Light Tobacco Smoker     Types: Cigarettes     Smokeless tobacco: Never Used      Comment: 3-5 cpd     Alcohol use No      Comment: once every 2 weeks     Family History   Problem Relation Age of Onset     HEART DISEASE Maternal Grandfather      DIABETES Maternal Grandfather      DIABETES Father      Hypertension Father      DIABETES Paternal Grandfather      Hypertension Paternal Grandfather      Hypertension Paternal Uncle        ROS:  A complete 10 point review of systems was taken and negative except for those noted in the subjective/HPI section(s) above     BP Readings from Last 3 Encounters:   08/25/17 114/70   05/08/17 120/68   04/26/17 126/72    Wt Readings from Last 3 Encounters:   08/25/17 284 lb 3.2 oz (128.9 kg)   05/08/17 285 lb (129.3 kg)   04/26/17 285 lb 9.6 oz (129.5 kg)                 OBJECTIVE:                                                    /70  Pulse 80  Temp 98  F (36.7  C) (Oral)  Resp 20  Wt 284 lb 3.2 oz (128.9 kg)  LMP 08/25/2017  BMI 45.87 kg/m2   Wt Readings from Last 4 Encounters:   08/25/17 284 lb 3.2 oz (128.9 kg)   05/08/17 285 lb (129.3 kg)   04/26/17 285 lb 9.6 oz (129.5 kg)   12/05/16 283 lb 3.2 oz (128.5 kg)       Constitutional: healthy, alert and no distress  Head: Normocephalic. No masses, lesions, tenderness or abnormalities  Neck: Neck supple. No adenopathy.   ENT: ENT exam normal, no neck nodes or sinus tenderness tympanic membranes within normal limits bilaterally, no tonsillar hypertrophy or exudates  Cardiovascular: regular rate and rhythm no rubs, gallops or murmurs normal S1/S2; no S3 or S4  Respiratory: clear to auscultation bilaterally in all lung fields, normal insp/exp effort. Good air movement  Gastrointestinal: Abdomen soft, non-tender. BS normal. No masses, organomegaly  Musculoskeletal: extremities normal- no gross deformities noted  Skin: no suspicious lesions or rashes; skin turgor normal with normal cap refill  Psychiatric: mentation appears  normal and affect normal/bright      Results for orders placed or performed in visit on 08/25/17   Lyme Disease [place] with reflex to WB Serum   Result Value Ref Range    Lyme Disease Antibodies Serum 0.06 0.00 - 0.89   CBC with platelets differential   Result Value Ref Range    WBC 8.1 4.0 - 11.0 10e9/L    RBC Count 4.16 3.8 - 5.2 10e12/L    Hemoglobin 13.2 11.7 - 15.7 g/dL    Hematocrit 39.6 35.0 - 47.0 %    MCV 95 78 - 100 fl    MCH 31.7 26.5 - 33.0 pg    MCHC 33.3 31.5 - 36.5 g/dL    RDW 12.0 10.0 - 15.0 %    Platelet Count 318 150 - 450 10e9/L    Diff Method Automated Method     % Neutrophils 61.8 %    % Lymphocytes 25.8 %    % Monocytes 10.2 %    % Eosinophils 2.2 %    % Basophils 0.0 %    Absolute Neutrophil 5.0 1.6 - 8.3 10e9/L    Absolute Lymphocytes 2.1 0.8 - 5.3 10e9/L    Absolute Monocytes 0.8 0.0 - 1.3 10e9/L    Absolute Eosinophils 0.2 0.0 - 0.7 10e9/L    Absolute Basophils 0.0 0.0 - 0.2 10e9/L   Comprehensive metabolic panel   Result Value Ref Range    Sodium 140 133 - 144 mmol/L    Potassium 4.4 3.4 - 5.3 mmol/L    Chloride 106 94 - 109 mmol/L    Carbon Dioxide 27 20 - 32 mmol/L    Anion Gap 7 3 - 14 mmol/L    Glucose 91 70 - 99 mg/dL    Urea Nitrogen 7 7 - 30 mg/dL    Creatinine 0.64 0.52 - 1.04 mg/dL    GFR Estimate >90 >60 mL/min/1.7m2    GFR Estimate If Black >90 >60 mL/min/1.7m2    Calcium 7.5 (L) 8.5 - 10.1 mg/dL    Bilirubin Total 0.3 0.2 - 1.3 mg/dL    Albumin 3.3 (L) 3.4 - 5.0 g/dL    Protein Total 7.0 6.8 - 8.8 g/dL    Alkaline Phosphatase 75 40 - 150 U/L    ALT 91 (H) 0 - 50 U/L    AST 89 (H) 0 - 45 U/L            ASSESSMENT/PLAN:                                                        ICD-10-CM    1. Viral gastroenteritis A08.4 Lyme Disease [place] with reflex to WB Serum     CBC with platelets differential     Comprehensive metabolic panel   2. Flu-like symptoms R68.89 Lyme Disease [place] with reflex to WB Serum     CBC with platelets differential     Comprehensive metabolic panel     ondansetron  "(ZOFRAN) 4 MG tablet   3. Nausea R11.0 ondansetron (ZOFRAN) 4 MG tablet   .[id number] physical exam within acceptable limits today, history consistant with likely viral gastroenterology and agree with plan to continue supportive cares. given history and possible distant tick bite willg et lyme screen, did review limitations of this testing with patient today in detail. okay top use ondansetron as needed nausea and stressed BRAT diet, etc. if persists, would consider further evaland possible GI evaluation. Patient verbalized understanding and is agreeable to this plan.         Estimated body mass index is 46 kg/(m^2) as calculated from the following:    Height as of 5/8/17: 5' 6\" (1.676 m).    Weight as of 5/8/17: 285 lb (129.3 kg).  Weight management plan: Patient was referred to their PCP to discuss a diet and exercise plan.    Return to clinic as needed or if symptoms persist, change, worsen or if any new symptoms develop.      Wiliam Brown M.D.  Internal Medicine-Pediatrics              "

## 2017-08-25 NOTE — PATIENT INSTRUCTIONS
"  Diarrhea (Adult, Viral)    Diarrhea caused by a virus is often called viral gastroenteritis. Many people call it the \"stomach flu,\" but it has nothing to do with influenza. The virus that causes diarrhea affects the stomach and intestinal tract and usually lasts from 2 to 7 days. Diarrhea is the passing of loose, watery stools 3 or more times a day.  Symptoms  Along with diarrhea, you may have these symptoms:    Abdominal pain and cramping    Nausea and vomiting    Loss of bowel control    Fever and chills    Bloody stools  The danger from repeated diarrhea is dehydration. Dehydration is the loss of too much water and other fluids from the body without taking in enough to replace what is lost.  Antibiotics are not effective in this illness, but there are a number of things you can do at home that will help.  Home care  Follow these home care measures:    If symptoms are severe, rest at home for the next 24 hours or until you are feeling better.    Wash your hands with soap and water or alcohol-based  to prevent the spread of infection. Wash your hands after touching anyone who is sick.    Wash your hands after using the toilet and before meals. Clean the toilet after each use.  Food preparation:    People with diarrhea should not prepare food for others. When preparing foods, wash your hands after touching anyone who is sick.    Wash your hands after using cutting boards, countertops, and knives that have been in contact with raw food.    Keep uncooked meats away from cooked and ready-to-eat foods.  Medications:    You may use acetaminophen or NSAIDS such as ibuprofen or naproxen to control fever unless another medicine was prescribed.  If you have chronic liver or kidney disease or ever had a stomach ulcer or gastrointestinal bleeding, talk with your healthcare provider before using these medicines. Aspirin should never be used in anyone under 18 years of age who is ill with a fever. It may cause severe " liver damage. Don't use NSAID medicines if you are already taking one for another condition (like arthritis) or are on aspirin (such as for heart disease or after a stroke).    Anti-diarrhea medicine should be taken for this condition only if advised by your healthcare provider. Sometimes anti-diarrhea medicine can make your condition worse.  Diet:    Water and clear liquids are important so you do not get dehydrated. Drink small amounts at a time, do not guzzle it down. If you are very dehydrated, sports drinks aren't a good choice. They have too much sugar and not enough electrolytes. In this case, commercially available products called oral rehydration solutions are best.    Caffeine, tobacco, and alcohol can make the diarrhea, cramping, and pain worse.    Do not force yourself to eat, especially if you have cramping, vomiting, or diarrhea. Do not eat large amounts at a time, even if you are hungry. It may make you feel worse.    If you eat, avoid fatty, greasy, spicy, or fried foods.    No dairy products, as they can make diarrhea worse.  During the first 24 Hours (the first full day) follow the diet below:    Beverages: Water, clear liquids, soft drinks without caffeine; ginger ale, mineral water (plain or flavored), decaffeinated tea and coffee.    Soups: Clear broth, consommé and bouillon    Desserts: Plain gelatin, popsicles and fruit juice bars  During the next 24 hours (the second day) you may add the following to the above if you have improved:    Hot cereal, plain toast, bread, rolls, crackers    Plain noodles, rice, mashed potatoes, chicken noodle or rice soup    Unsweetened canned fruit (avoid pineapple), bananas    Limit fat intake to less than 15 grams per day by avoiding margarine, butter, oils, mayonnaise, sauces, gravies, fried foods, peanut butter, meat, poultry and fish.    Limit fiber; avoid raw or cooked vegetables, fresh fruits (except bananas) and bran cereals.    Limit caffeine and  chocolate. No spices or seasonings except salt.  During the next 24 hours    Gradually resume a normal diet, as you feel better and your symptoms improve.    If at any time the diarrhea or cramping gets worse, go back to the simpler diet (above) or to clear liquids.  Follow-up care  Follow up with your healthcare provider, or as advised. Call if you are not improving within 24 hours or if the diarrhea lasts more than one week. If a stool (diarrhea) sample was taken, you may call in 2 days (or as directed) for the results.  Call 911  Call 911 if any of these occur:    Shortness of breath    Chest pain    Drowsiness, confusion, stiff neck, or seizure  When to seek medical care  Get prompt medical attention if any of the following occur:    Increasing abdominal pain or constant lower right abdominal pain    Continued vomiting (unable to keep liquids down)    Frequent diarrhea (more than 5 times a day)    Blood in vomit or stool (black or red color)    Reduced oral intake    Dark urine, reduced urine output    Weakness, dizziness    Drowsiness    Fever of 100.4 F (38 C) oral or higher, not better with fever medicine    New rash  Call 911  Call emergency services if any of the following occur:    Trouble breathing    Confused    Severe drowsiness or trouble awakening    Fainting or loss of consciousness    Rapid heart rate    Seizure    Stiff neck  Date Last Reviewed: 11/16/2015 2000-2017 [employer]. [address] [place] [address], [place] [address]. All rights reserved. This information is not intended as a substitute for professional medical care. Always follow your healthcare professional's instructions.

## 2017-08-25 NOTE — NURSING NOTE
"Chief Complaint   Patient presents with     Gas       Initial /70  Pulse 80  Temp 98  F (36.7  C) (Oral)  Resp 20  Wt 284 lb 3.2 oz (128.9 kg)  LMP 08/25/2017  BMI 45.87 kg/m2 Estimated body mass index is 45.87 kg/(m^2) as calculated from the following:    Height as of 5/8/17: 5' 6\" (1.676 m).    Weight as of this encounter: 284 lb 3.2 oz (128.9 kg).  Medication Reconciliation: complete   Michelle J, CMA,AAMA      "

## 2017-08-26 LAB
ALBUMIN SERPL-MCNC: 3.3 G/DL (ref 3.4–5)
ALP SERPL-CCNC: 75 U/L (ref 40–150)
ALT SERPL W P-5'-P-CCNC: 91 U/L (ref 0–50)
ANION GAP SERPL CALCULATED.3IONS-SCNC: 7 MMOL/L (ref 3–14)
AST SERPL W P-5'-P-CCNC: 89 U/L (ref 0–45)
B BURGDOR IGG+IGM SER QL: 0.06 (ref 0–0.89)
BILIRUB SERPL-MCNC: 0.3 MG/DL (ref 0.2–1.3)
BUN SERPL-MCNC: 7 MG/DL (ref 7–30)
CALCIUM SERPL-MCNC: 7.5 MG/DL (ref 8.5–10.1)
CHLORIDE SERPL-SCNC: 106 MMOL/L (ref 94–109)
CO2 SERPL-SCNC: 27 MMOL/L (ref 20–32)
CREAT SERPL-MCNC: 0.64 MG/DL (ref 0.52–1.04)
GFR SERPL CREATININE-BSD FRML MDRD: >90 ML/MIN/1.7M2
GLUCOSE SERPL-MCNC: 91 MG/DL (ref 70–99)
POTASSIUM SERPL-SCNC: 4.4 MMOL/L (ref 3.4–5.3)
PROT SERPL-MCNC: 7 G/DL (ref 6.8–8.8)
SODIUM SERPL-SCNC: 140 MMOL/L (ref 133–144)

## 2017-08-26 ASSESSMENT — ANXIETY QUESTIONNAIRES: GAD7 TOTAL SCORE: 10

## 2017-10-07 ENCOUNTER — HEALTH MAINTENANCE LETTER (OUTPATIENT)
Age: 22
End: 2017-10-07

## 2017-10-12 DIAGNOSIS — E66.01 MORBID OBESITY DUE TO EXCESS CALORIES (H): ICD-10-CM

## 2017-10-12 NOTE — LETTER
[facility]  [address] [place] [address]  [phone]      October 19, 2017      Tricia Eubanks  [address] [address]                  Dear Tricia,     This is to remind you that your physician expected you to return for a follow up clinic visit. Dr Echeverria will need to see for any farther refills. When on this medication you will need to have a follow up every three months.     You may call our office at [phone] ([place]) or [phone] ([place]) to schedule an appointment.    Please disregard this notice if you have recently had an appointment.      Sincerely,      Dr. Samantha Echeverria

## 2017-10-12 NOTE — TELEPHONE ENCOUNTER
Patient calling requesting refill. She has a follow up appt scheduled for 11/7 with Dr Echeverria.    phentermine     Last Written Prescription Date:  5/8/17  Last Fill Quantity: 30,   # refills: 3  Last Office Visit with [facility], [facility] or [facility] prescribing provider: 5/8/17  Future Office visit:    Next 5 appointments (look out 90 days)     Nov 07, 2017 10:30 AM CST   Return Visit with Samantha Echeverria MD   [facility] ([facility])    [address] [place] [id number] [phone]                   Routing refill request to provider for review/approval because:  Drug not on the [facility], [facility] or [facility] refill protocol or controlled substance

## 2017-10-16 DIAGNOSIS — E66.01 MORBID OBESITY DUE TO EXCESS CALORIES (H): ICD-10-CM

## 2017-10-16 RX ORDER — PHENTERMINE HYDROCHLORIDE 37.5 MG/1
37.5 TABLET ORAL
Qty: 30 TABLET | Refills: 1 | Status: SHIPPED | OUTPATIENT
Start: 2017-10-16 | End: 2017-10-19

## 2017-10-16 NOTE — TELEPHONE ENCOUNTER
Done  Will be faxed  30 tab + 1 refill  Will send further refills at upcoming appointment 11/2017.

## 2017-10-18 RX ORDER — PHENTERMINE HYDROCHLORIDE 37.5 MG/1
TABLET ORAL
Qty: 30 TABLET | Refills: 1 | OUTPATIENT
Start: 2017-10-18

## 2017-10-18 NOTE — TELEPHONE ENCOUNTER
Rx was faxed 10/16/17. Called pharmacy, did not receive. Sent 10/12/17 refill request back to MD. Beatriz Mcduffie RN

## 2017-10-18 NOTE — TELEPHONE ENCOUNTER
Can you please look inoto this?  Was it faxed. If not we need to refax  Please pend the orders with correct quantity, select pharmacy and then send for signature. Also associate with correct diagnosis.    Thank you.    Saamntha Echeverria

## 2017-10-18 NOTE — TELEPHONE ENCOUNTER
Pharmacy sent refill request for Phentermine, called pharmacy to verify if they received the fax 10/16/17 as stated below. Pharmacy stated they did not receive a fax Rx. Please advise.  Beatriz Mcduffie RN

## 2017-10-19 RX ORDER — PHENTERMINE HYDROCHLORIDE 37.5 MG/1
37.5 TABLET ORAL
Qty: 30 TABLET | Refills: 0 | Status: SHIPPED | OUTPATIENT
Start: 2017-10-19 | End: 2017-11-07

## 2017-10-19 NOTE — TELEPHONE ENCOUNTER
When was last clinic visit?  Please pend the orders with correct quantity, select pharmacy and then send for signature. Also associate with correct diagnosis.    Thank you.    Samantha Echeverria

## 2017-10-19 NOTE — TELEPHONE ENCOUNTER
I can not find this in [place] I can look in [place] or can write a new RX please advise

## 2017-10-19 NOTE — TELEPHONE ENCOUNTER
I will do refill for one month.  She needs to follow up in clinic for further refills.  While on phentermine patient needs to have follow-up every three months.

## 2017-11-07 ENCOUNTER — OFFICE VISIT (OUTPATIENT)
Dept: ENDOCRINOLOGY | Facility: CLINIC | Age: 22
End: 2017-11-07
Payer: COMMERCIAL

## 2017-11-07 VITALS
SYSTOLIC BLOOD PRESSURE: 110 MMHG | HEIGHT: 66 IN | WEIGHT: 281.2 LBS | DIASTOLIC BLOOD PRESSURE: 72 MMHG | OXYGEN SATURATION: 95 % | HEART RATE: 87 BPM | TEMPERATURE: 98 F | BODY MASS INDEX: 45.19 KG/M2

## 2017-11-07 DIAGNOSIS — E66.01 MORBID OBESITY DUE TO EXCESS CALORIES (H): Primary | ICD-10-CM

## 2017-11-07 DIAGNOSIS — R63.5 ABNORMAL WEIGHT GAIN: ICD-10-CM

## 2017-11-07 PROCEDURE — 99213 OFFICE O/P EST LOW 20 MIN: CPT | Performed by: INTERNAL MEDICINE

## 2017-11-07 RX ORDER — PHENTERMINE HYDROCHLORIDE 37.5 MG/1
37.5 TABLET ORAL
Qty: 31 TABLET | Refills: 3 | Status: SHIPPED | OUTPATIENT
Start: 2017-11-07 | End: 2018-05-24

## 2017-11-07 NOTE — MR AVS SNAPSHOT
After Visit Summary   2017    Tricia Eubanks    MRN: [id number]           Patient Information     Date Of Birth          1995        Visit Information        Provider Department      2017 10:30 AM Samantha Echeverria MD [facility]        Today's Diagnoses     Morbid obesity due to excess calories (H)    -  1    Abnormal weight gain          Care Instructions    [facility] & [place] locations   Dr Echeverria, Endocrinology Department      [facility]   [address]  [place] [address]  Appointment [phone]  Fax: [phone]  [place]: Monday and Tuesday         [facility]   [address] [facility] [facility] [address]  [place] [address]  Appointment [phone]  Fax: [phone]  [place]: Wednesday and Thursday            Continue phentermine 37.5mg /day  Follow up in 3 months    The patient is advised to Make better food choices: reduce carbs, Reduce portion size, weight loss and exercise 3-4 times a week.                Follow-ups after your visit        Who to contact     If you have questions or need follow up information about today's clinic visit or your schedule please contact [facility] directly at [phone].  Normal or non-critical lab and imaging results will be communicated to you by MyChart, letter or phone within 4 business days after the clinic has received the results. If you do not hear from us within 7 days, please contact the clinic through MyChart or phone. If you have a critical or abnormal lab result, we will notify you by phone as soon as possible.  Submit refill requests through [employer] or call your pharmacy and they will forward the refill request to us. Please allow 3 business days for your refill to be completed.          Additional Information About Your Visit        [employer] Information     [employer] gives you secure access to your electronic  "health record. If you see a primary care provider, you can also send messages to your care team and make appointments. If you have questions, please call your primary care clinic.  If you do not have a primary care provider, please call [phone] and they will assist you.        Care EveryWhere ID     This is your Care EveryWhere ID. This could be used by other organizations to access your [facility] medical records  [id number]        Your Vitals Were     Pulse Temperature Height Pulse Oximetry BMI (Body Mass Index)       87 98  F (36.7  C) (Oral) 1.676 m (5' 6\") 95% 45.39 kg/m2        Blood Pressure from Last 3 Encounters:   11/07/17 110/72   08/25/17 114/70   05/08/17 120/68    Weight from Last 3 Encounters:   11/07/17 127.6 kg (281 lb 3.2 oz)   08/25/17 128.9 kg (284 lb 3.2 oz)   05/08/17 129.3 kg (285 lb)              Today, you had the following     No orders found for display         Where to get your medicines      Some of these will need a paper prescription and others can be bought over the counter.  Ask your nurse if you have questions.     Bring a paper prescription for each of these medications     phentermine 37.5 MG tablet          Primary Care Provider Office Phone # Fax #    Carine Ross [phone] [address] [place] [address]        Equal Access to Services     [facility]: Hadii vonnie salomon hadasho Sovicali, waaxda luqadaha, qaybta kaalmada mary annda, viviana betts . So [facility] [phone].    ATENCIÓN: Si habla español, tiene a yap disposición servicios gratuitos de asistencia lingüística. Llame al [phone].    We comply with applicable federal civil rights laws and [place] laws. We do not discriminate on the basis of race, color, national origin, age, disability, sex, sexual orientation, or gender identity.            Thank you!     Thank you for choosing [facility]  for your care. Our goal is always to " provide you with excellent care. Hearing back from our patients is one way we can continue to improve our services. Please take a few minutes to complete the written survey that you may receive in the mail after your visit with us. Thank you!             Your Updated Medication List - Protect others around you: Learn how to safely use, store and throw away your medicines at www.disposemymeds.org.          This list is accurate as of: 11/7/17 11:04 AM.  Always use your most recent med list.                   Brand Name Dispense Instructions for use Diagnosis    fish Oil 1200 MG capsule      Take 2 capsules by mouth daily        FLUoxetine 40 MG capsule    PROzac    180 capsule    Take 2 capsules (80 mg) by mouth daily    Major depressive disorder, recurrent episode, moderate (H), Anxiety       multivitamin, therapeutic Tabs tablet      Take 1 tablet by mouth daily        ondansetron 4 MG tablet    ZOFRAN    18 tablet    Take 1 tablet (4 mg) by mouth every 8 hours as needed for nausea    Flu-like symptoms, Nausea       phentermine 37.5 MG tablet    ADIPEX-P    31 tablet    Take 1 tablet (37.5 mg) by mouth every morning (before breakfast)    Morbid obesity due to excess calories (H)       traZODone 50 MG tablet    DESYREL    60 tablet    Take 1-2 tablets ( mg) by mouth nightly as needed for sleep    Primary insomnia

## 2017-11-07 NOTE — PATIENT INSTRUCTIONS
[facility]   Dr Echeverria, Endocrinology Department      [facility]   [address]  [place] [address]  Appointment [phone]  Fax: [phone]  [place]: Monday and Tuesday         [place] [address] [facility]  [place] [address]  Appointment [phone]  Fax: [phone]  [place]: Wednesday and Thursday            Continue phentermine 37.5mg /day  Follow up in 3 months    The patient is advised to Make better food choices: reduce carbs, Reduce portion size, weight loss and exercise 3-4 times a week.

## 2017-11-07 NOTE — PROGRESS NOTES
Name: Tricia Eubanks  Seen at the request of No ref. provider found for obesity.  HPI:  Tricia Eubanks is a 21 year old female who presents for the evaluation of obestiy.    Body mass index is 45.39 kg/(m^2).     ENDO VITALS-[facility] Weight   10/17/2016 131.09 kg   8/2/2016 129.729 kg   3/24/2016    3/24/2016 121.972 kg   1/13/2016 119.614 kg     Started to gain weight 2014. Around the same time was started on Prozac for depression.  Gained about 70-80 lbs in last 2 years.  Job is stressful. Graduated last year.  Was not exercising last year. Started exercise X 1 year.  Not counting calories. + alcohol use.  Strong FH of obesity.  Current everyday smoker.  Trying to cut down on alcohol and smoking.  Last visit 5/2017- took phentermine X 3 months.  After that ran out.  Restarted 2 weeks back.  Thinks that phentermine was helping with craving. And she was loosing wt.  Normal BP and HR.    Thyroid labs normal. 24 hr urine collection with upper normal limit of cortisol.  S/p DSt which was normal.  Normal A1c.  She was started on phentermine in October 2016.  She took it for a while but ran out of medication about 1.5 months back.  Initially lost weight on phentermine and after that plateaued.  Was tolerating phentermine well.    Started pehentermine 10/20/16.  Pre start: 289 lbs  12/16- 283 lbs  5/2017- 285 ( stopped phentermine 1 month back)  11/2017- 281 ( off X 3 months, restarted 2 weeks back)      Menses: has IUD X 5 years  Diarrhea/Constipation:no  Changes in Hair or Skin:No  Diabetes:No  Sleep: Ok  Sleep Apnea/Snores:Yes: snores at night. no sleep study done. encouraged to f/u with sleep study  Hypertension:No  Hyperlipidemia:No  Hirsutism:No  Easy Brusing:Yes: thinks that she bruises easily.   Use of Steroids:No  Family history of Obesity:Yes: mother, father: obese. 1 sister: overweight, 1 sister: obese. grandparensts: obese  Diet: tries to watch she is eating. Alcohol use 3-4 times/week. 5-6 drinks each  "time. Trying to cut down  Exercise: 2-3 times/week going to gym. Doing elliptical and weights.    PMH/PSH:  History reviewed. No pertinent past medical history.  History reviewed. No pertinent surgical history.  Family Hx:  Family History   Problem Relation Age of Onset     HEART DISEASE Maternal Grandfather      DIABETES Maternal Grandfather      DIABETES Father      Hypertension Father      DIABETES Paternal Grandfather      Hypertension Paternal Grandfather      Hypertension Paternal Uncle              Social Hx:  Social History     Social History     Marital status: Single     Spouse name: N/A     Number of children: N/A     Years of education: N/A     Occupational History     Not on file.     Social History Main Topics     Smoking status: Light Tobacco Smoker     Types: Cigarettes     Smokeless tobacco: Never Used      Comment: 3-5 cpd     Alcohol use No      Comment: once every 2 weeks     Drug use: No     Sexual activity: Yes     Partners: Male     Birth control/ protection: IUD     Other Topics Concern     Not on file     Social History Narrative          MEDICATIONS:  has a current medication list which includes the following prescription(s): phentermine, ondansetron, fluoxetine, multivitamin, therapeutic, fish oil, and trazodone.    ROS     ROS: 10 point ROS neg other than the symptoms noted above in the HPI.    Physical Exam   VS: /72 (BP Location: Right arm, Patient Position: Chair, Cuff Size: Adult Large)  Pulse 87  Temp 98  F (36.7  C) (Oral)  Ht 1.676 m (5' 6\")  Wt 127.6 kg (281 lb 3.2 oz)  SpO2 95%  BMI 45.39 kg/m2  GENERAL: AXOX3, NAD, well dressed, answering questions appropriately, appears stated age.  HEENT: No exopthalmous, no proptosis, EOMI, no lig lag, no retraction  NECK: Thyroid normal in size, non tender, no nodules were palpated.  CV: RRR  LUNGS: CTAB  ABDOMEN: +BS  NEUROLOGY: CN grossly intact, no tremors  PSYCH: normal affect and mood    LABS:  Last Basic Metabolic " Panel:  Lab Results   Component Value Date     08/02/2016      Lab Results   Component Value Date    POTASSIUM 4.2 08/02/2016     Lab Results   Component Value Date    CHLORIDE 102 08/02/2016     Lab Results   Component Value Date    JORGE 8.7 08/02/2016     Lab Results   Component Value Date    CO2 27 08/02/2016     Lab Results   Component Value Date    BUN 10 08/02/2016     Lab Results   Component Value Date    CR 0.59 08/02/2016     Lab Results   Component Value Date    GLC 97 08/02/2016         ENDO THYROID LABS-[facility] Latest Ref Rng 8/2/2016 3/24/2016   TSH 0.40 - 4.00 mU/L 1.70 1.26   T4 FREE 0.76 - 1.46 ng/dL 0.96      A1C      5.4   3/24/2016      Results for JANICE PINTO (MRN [id number])    Ref. Range 8/6/2016 17:50   Cortisol Free Duration Urine Latest Units: h 24...   Cortisol Free Urine Unknown 42.3   Cortisol Free Urine Intrepretation Unknown SEE NOTE...   Cortisol Free Urine Random Unknown 17.80   Cortisol ug/g creatinine Unknown 31.79       All pertinent notes, labs, and images personally reviewed by me.     A/P  Ms.Kristen Elizabeth Pinto is a 21 year old here for the evaluation of obestiy:    1. Obesity- Body mass index is 45.39 kg/(m^2).  Thyroid labs normal. 24 hr urine collection with upper normal limit of cortisol. S/p DST which was normal. EKG OK.   she was on phentermine 37.5 mg per day.  Was tolerating going okay.  Lost some weight on phentermine in past.  Ran pout X 3 months. Restarted 2 weeks back.  -- Restart phentermine 37.5 mg per day  -- Follow up in three months  -- The patient is advised to Make better food choices: reduce carbs, Reduce portion size, weight loss and exercise 3-4 times a week.    -- Advised to cut down on alcohol intake and smoking cessation.  -- I also discussed bariatric surgery briefly with her in previous visit.  She would like to focus on medication weight loss at this time and will consider bariatric surgery is no success with medical weight loss.  I informed  her about patient information seminars offered at bariatric surgery Center at [facility].    2. High urine cortisol:  24 hr urine collection with upper normal limit of cortisol.  S/p DSt which was normal.  Continue to monitor.    More than 50% of the time spent with Ms. Eubanks on counseling / coordinating her care.  Total appointment time was 15 minutes.      Follow-up:  3 months    Samantha Echeverria MD  Endocrinology   [facility]

## 2017-11-07 NOTE — NURSING NOTE
"Chief Complaint   Patient presents with     RECHECK     follow up weight LOV 17       Initial /72 (BP Location: Right arm, Patient Position: Chair, Cuff Size: Adult Large)  Pulse 87  Temp 98  F (36.7  C) (Oral)  Ht 1.676 m (5' 6\")  Wt 127.6 kg (281 lb 3.2 oz)  SpO2 95%  BMI 45.39 kg/m2 Estimated body mass index is 45.39 kg/(m^2) as calculated from the following:    Height as of this encounter: 1.676 m (5' 6\").    Weight as of this encounter: 127.6 kg (281 lb 3.2 oz).  Medication Reconciliation: complete     ENDOCRINOLOGY INTAKE FORM    Patient Name:  Tricia Eubanks  :  1995    Is patient Diabetic?   No  Does patient have non-diabetic or other endocrine issues?  Yes: weight    Vitals: /72 (BP Location: Right arm, Patient Position: Chair, Cuff Size: Adult Large)  Pulse 87  Temp 98  F (36.7  C) (Oral)  Ht 1.676 m (5' 6\")  Wt 127.6 kg (281 lb 3.2 oz)  SpO2 95%  BMI 45.39 kg/m2  BMI= Body mass index is 45.39 kg/(m^2).    Flu vaccine:  No  Pneumonia vaccine:  No    Smoking and Alcohol use:  Social History   Substance Use Topics     Smoking status: Light Tobacco Smoker     Types: Cigarettes     Smokeless tobacco: Never Used      Comment: 3-5 cpd     Alcohol use No      Comment: once every 2 weeks       Lab Results   Component Value Date    A1C 5.4 2016       No results found for: MICROL  No results found for: MICROALBUMIN    OBESITY CONCERNS:  No ref. provider found       Initial Visit Previous Visit Current Change   Weight 283 285 281 2   Height       BMI 46.39 46 45.89 0.5     Weight at graduation of high school:   Diabetes:  No  Sleep Apnea/Snores: Yes:   Hypertension:  No  Hyperlipidemia:  No  Use of steroids:  No  Family history of obesity:  Yes:   Diet:  Try to eat healthy   Exercise: Yes:     Staff Signature:  Munira Nielsen CMA ([facility])        "

## 2017-11-13 ENCOUNTER — OFFICE VISIT (OUTPATIENT)
Dept: PEDIATRICS | Facility: CLINIC | Age: 22
End: 2017-11-13
Payer: COMMERCIAL

## 2017-11-13 VITALS
WEIGHT: 285.1 LBS | BODY MASS INDEX: 45.82 KG/M2 | SYSTOLIC BLOOD PRESSURE: 120 MMHG | OXYGEN SATURATION: 98 % | TEMPERATURE: 98.6 F | HEIGHT: 66 IN | HEART RATE: 104 BPM | DIASTOLIC BLOOD PRESSURE: 76 MMHG

## 2017-11-13 DIAGNOSIS — F10.10 ALCOHOL ABUSE: ICD-10-CM

## 2017-11-13 DIAGNOSIS — F41.9 ANXIETY: ICD-10-CM

## 2017-11-13 DIAGNOSIS — F51.01 PRIMARY INSOMNIA: ICD-10-CM

## 2017-11-13 DIAGNOSIS — F33.1 MAJOR DEPRESSIVE DISORDER, RECURRENT EPISODE, MODERATE (H): Primary | ICD-10-CM

## 2017-11-13 PROCEDURE — 99214 OFFICE O/P EST MOD 30 MIN: CPT | Performed by: INTERNAL MEDICINE

## 2017-11-13 RX ORDER — FLUOXETINE 40 MG/1
80 CAPSULE ORAL DAILY
Qty: 180 CAPSULE | Refills: 1 | Status: SHIPPED | OUTPATIENT
Start: 2017-11-13 | End: 2018-05-10

## 2017-11-13 RX ORDER — TRAZODONE HYDROCHLORIDE 50 MG/1
50-100 TABLET, FILM COATED ORAL
Qty: 60 TABLET | Refills: 1 | Status: SHIPPED | OUTPATIENT
Start: 2017-11-13 | End: 2018-08-27

## 2017-11-13 ASSESSMENT — ANXIETY QUESTIONNAIRES
6. BECOMING EASILY ANNOYED OR IRRITABLE: NEARLY EVERY DAY
GAD7 TOTAL SCORE: 14
IF YOU CHECKED OFF ANY PROBLEMS ON THIS QUESTIONNAIRE, HOW DIFFICULT HAVE THESE PROBLEMS MADE IT FOR YOU TO DO YOUR WORK, TAKE CARE OF THINGS AT HOME, OR GET ALONG WITH OTHER PEOPLE: SOMEWHAT DIFFICULT
2. NOT BEING ABLE TO STOP OR CONTROL WORRYING: NEARLY EVERY DAY
1. FEELING NERVOUS, ANXIOUS, OR ON EDGE: NEARLY EVERY DAY
3. WORRYING TOO MUCH ABOUT DIFFERENT THINGS: NEARLY EVERY DAY
5. BEING SO RESTLESS THAT IT IS HARD TO SIT STILL: SEVERAL DAYS
7. FEELING AFRAID AS IF SOMETHING AWFUL MIGHT HAPPEN: NOT AT ALL

## 2017-11-13 ASSESSMENT — PATIENT HEALTH QUESTIONNAIRE - PHQ9
5. POOR APPETITE OR OVEREATING: SEVERAL DAYS
SUM OF ALL RESPONSES TO PHQ QUESTIONS 1-9: 9

## 2017-11-13 NOTE — NURSING NOTE
"Chief Complaint   Patient presents with     RECHECK     Depression       Initial /76 (BP Location: Right arm, Patient Position: Chair, Cuff Size: Adult Large)  Pulse 104  Temp 98.6  F (37  C) (Tympanic)  Ht 5' 6\" (1.676 m)  Wt 285 lb 1.6 oz (129.3 kg)  SpO2 98%  BMI 46.02 kg/m2 Estimated body mass index is 46.02 kg/(m^2) as calculated from the following:    Height as of this encounter: 5' 6\" (1.676 m).    Weight as of this encounter: 285 lb 1.6 oz (129.3 kg).  Medication Reconciliation: complete   Christina Ribeiro LPN      "

## 2017-11-13 NOTE — MR AVS SNAPSHOT
After Visit Summary   11/13/2017    Tricia Eubanks    MRN: [id number]           Patient Information     Date Of Birth          1995        Visit Information        Provider Department      11/13/2017 10:40 AM [facility] MD [facility]        Today's Diagnoses     Primary insomnia        Major depressive disorder, recurrent episode, moderate (H)        Anxiety          Care Instructions    1. Schedule physical with pap in next 6 months or so  2. Refilled fluoxetine and trazodone          Follow-ups after your visit        Your next 10 appointments already scheduled     Feb 19, 2018 10:00 AM CST   Return Visit with Samantha Echeverria MD   [facility] ([facility])    [address]  [address]  [facility] [address] [phone]              Who to contact     If you have questions or need follow up information about today's clinic visit or your schedule please contact [facility] directly at [phone].  Normal or non-critical lab and imaging results will be communicated to you by MakeGamesWithUshart, letter or phone within 4 business days after the clinic has received the results. If you do not hear from us within 7 days, please contact the clinic through VerticalResponset or phone. If you have a critical or abnormal lab result, we will notify you by phone as soon as possible.  Submit refill requests through SecureAlert or call your pharmacy and they will forward the refill request to us. Please allow 3 business days for your refill to be completed.          Additional Information About Your Visit        MyChart Information     SecureAlert gives you secure access to your electronic health record. If you see a primary care provider, you can also send messages to your care team and make appointments. If you have questions, please call your primary care clinic.  If you do not have a primary care provider, please call [phone] and they will assist  "you.        Care EveryWhere ID     This is your Care EveryWhere ID. This could be used by other organizations to access your [facility] medical records  [id number]        Your Vitals Were     Pulse Temperature Height Pulse Oximetry BMI (Body Mass Index)       104 98.6  F (37  C) (Tympanic) 5' 6\" (1.676 m) 98% 46.02 kg/m2        Blood Pressure from Last 3 Encounters:   11/13/17 120/76   11/07/17 110/72   08/25/17 114/70    Weight from Last 3 Encounters:   11/13/17 285 lb 1.6 oz (129.3 kg)   11/07/17 281 lb 3.2 oz (127.6 kg)   08/25/17 284 lb 3.2 oz (128.9 kg)              Today, you had the following     No orders found for display         Where to get your medicines      These medications were sent to [place] [address] [place] [facility] [place], [place] - [address]  [address] [address]     Phone:  [phone]     FLUoxetine 40 MG capsule    traZODone 50 MG tablet          Primary Care Provider Office Phone # Fax #    Carine Ross [phone]       [facility] [place] [address]        Equal Access to Services     MAZIN MACDONALD AH: Hadii vonnie ku hadasho Soomaali, waaxda luqadaha, qaybta kaalmada adeegyada, waxay khloein haymatilde lomax. So [facility] [phone].    ATENCIÓN: Si habla español, tiene a yap disposición servicios gratuitos de asistencia lingüística. Llame al [phone].    We comply with applicable federal civil rights laws and [place] laws. We do not discriminate on the basis of race, color, national origin, age, disability, sex, sexual orientation, or gender identity.            Thank you!     Thank you for choosing [facility]  for your care. Our goal is always to provide you with excellent care. Hearing back from our patients is one way we can continue to improve our services. Please take a few minutes to complete the written survey that you may receive in the mail after your visit with us. Thank you!             Your Updated " Medication List - Protect others around you: Learn how to safely use, store and throw away your medicines at www.disposemymeds.org.          This list is accurate as of: 11/13/17 11:17 AM.  Always use your most recent med list.                   Brand Name Dispense Instructions for use Diagnosis    fish Oil 1200 MG capsule      Take 2 capsules by mouth daily        FLUoxetine 40 MG capsule    PROzac    180 capsule    Take 2 capsules (80 mg) by mouth daily    Major depressive disorder, recurrent episode, moderate (H), Anxiety       multivitamin, therapeutic Tabs tablet      Take 1 tablet by mouth daily        ondansetron 4 MG tablet    ZOFRAN    18 tablet    Take 1 tablet (4 mg) by mouth every 8 hours as needed for nausea    Flu-like symptoms, Nausea       phentermine 37.5 MG tablet    ADIPEX-P    31 tablet    Take 1 tablet (37.5 mg) by mouth every morning (before breakfast)    Morbid obesity due to excess calories (H)       traZODone 50 MG tablet    DESYREL    60 tablet    Take 1-2 tablets ( mg) by mouth nightly as needed for sleep    Primary insomnia

## 2017-11-13 NOTE — PROGRESS NOTES
SUBJECTIVE:   Tricia Eubanks is a 22 year old female who presents to clinic today for the following health issues:    Depression and Anxiety Follow-Up    Status since last visit: No change    Other associated symptoms:None    Complicating factors:     Significant life event: Yes-       Current substance abuse: None    Quit drinking Sept 21st - tapered off. Has drank 2 days since then. Had some withdrawal - irritability, trouble sleeping. No tremors. Feels like anxiety a little worse. Sleeping now much better. Goes to bed at a regular time. Falls asleep in good a mount of time most of the time. Uses trazodone a few times a month. Feels like depression a little better. Has been going to [employer] and has a couple of sponsors. Notices has been smoking a little bit more to deal with anxiety and also craving sugar more. Has been working with endocrine to try to lose weight.     PHQ-9 Score and MyChart F/U Questions 4/26/2017 8/25/2017 11/13/2017   Total Score 17 15 9   Q9: Suicide Ideation Several days Not at all Not at all   F/U: Thoughts of suicide or self harm? - - -   F/U: Plan or intention for self harm? - - -   F/U: Acted on thoughts? - - -   F/U: Safety concerns for self or others? - - -     ALVARO-7 SCORE 4/26/2017 8/25/2017 11/13/2017   Total Score 13 10 14       PHQ-9  English  PHQ-9   Any Language  GAD7  Suicide Assessment Five-step Evaluation and Treatment (SAFE-T)      Amount of exercise or physical activity: 2-3 days/week for an average of 45-60 minutes    Problems taking medications regularly: No    Medication side effects: none    Diet: regular (no restrictions)    Reviewed and updated as needed this visit by clinical staffTobacco  Allergies  Meds  Problems  Med Hx  Surg Hx  Fam Hx  Soc Hx        Reviewed and updated as needed this visit by Provider  Allergies  Meds  Problems         -------------------------------------    ROS:  Constitutional, HEENT, cardiovascular, pulmonary, gi and gu systems are  "negative, except as otherwise noted.      OBJECTIVE:                                                    /76 (BP Location: Right arm, Patient Position: Chair, Cuff Size: Adult Large)  Pulse 104  Temp 98.6  F (37  C) (Tympanic)  Ht 5' 6\" (1.676 m)  Wt 285 lb 1.6 oz (129.3 kg)  SpO2 98%  BMI 46.02 kg/m2   Body mass index is 46.02 kg/(m^2).  General Appearance: healthy, alert and no distress  Eyes:   no discharge, erythema.  Normal pupils.  Skin: no rashes or lesions.  Well perfused and normal turgor.  Neuro: no tremor  Psychiatric: mentation appears normal and affect normal/bright.    Diagnostic Test Results:  none      ASSESSMENT/PLAN:                                                      (F33.1) Major depressive disorder, recurrent episode, moderate (H)  (primary encounter diagnosis)  Comment: improved  Plan: FLUoxetine (PROZAC) 40 MG capsule  - continue fluoxetine 80 mg daily   - recheck with physical/pap in next 6 months    (F41.9) Anxiety  Comment: slightly worse with quitting drinking. Does not want to make medication changes right now.  Plan: FLUoxetine (PROZAC) 40 MG capsule  - continue fluoxetine 80 mg daily  - discussed self cares, meditation, taking deep breaths  - discussed possibility of therapist if not improving    (F51.01) Primary insomnia  Comment: much better. Uses trazodone infrequently  Plan: traZODone (DESYREL) 50 MG tablet  - continue regular bedtimes and sleep hygiene    (F10.10) Alcohol abuse  Comment: quit drinking end of Sept  Plan:   - congratulated on stopping  - agree with AA and happy she has taken the stop to get sponsors  - encouragement given.    Tobacco Cessation:   reports that she has been smoking Cigarettes.  She has never used smokeless tobacco.  Tobacco Cessation Action Plan: Information offered: Patient not interested at this time    BMI:   Estimated body mass index is 46.02 kg/(m^2) as calculated from the following:    Height as of this encounter: 5' 6\" (1.676 m).    " Weight as of this encounter: 285 lb 1.6 oz (129.3 kg).   Weight management plan: Patient referred to endocrine and/or weight management specialty      Follow up with Provider - within next 6 months for physical and pap     [facility] [place]

## 2017-11-14 ASSESSMENT — ANXIETY QUESTIONNAIRES: GAD7 TOTAL SCORE: 14

## 2017-11-16 DIAGNOSIS — E66.01 MORBID OBESITY DUE TO EXCESS CALORIES (H): ICD-10-CM

## 2017-11-20 RX ORDER — PHENTERMINE HYDROCHLORIDE 37.5 MG/1
TABLET ORAL
Qty: 30 TABLET | Refills: 0 | COMMUNITY
Start: 2017-11-20

## 2017-11-20 NOTE — TELEPHONE ENCOUNTER
Called pharmacy asking why they needed and they never got RX was able to do verbal over the phone. Canceled this rx. Closed encounter

## 2018-01-11 ENCOUNTER — OFFICE VISIT (OUTPATIENT)
Dept: SURGERY | Facility: CLINIC | Age: 23
End: 2018-01-11
Payer: COMMERCIAL

## 2018-01-11 VITALS
OXYGEN SATURATION: 98 % | RESPIRATION RATE: 20 BRPM | WEIGHT: 278.06 LBS | BODY MASS INDEX: 44.69 KG/M2 | SYSTOLIC BLOOD PRESSURE: 146 MMHG | TEMPERATURE: 98.9 F | HEIGHT: 66 IN | DIASTOLIC BLOOD PRESSURE: 82 MMHG | HEART RATE: 113 BPM

## 2018-01-11 DIAGNOSIS — E66.01 MORBID OBESITY WITH BMI OF 40.0-44.9, ADULT (H): Primary | ICD-10-CM

## 2018-01-11 PROCEDURE — 99207 ZZC NO CHARGE LOS: CPT | Performed by: PHYSICIAN ASSISTANT

## 2018-01-11 NOTE — MR AVS SNAPSHOT
After Visit Summary   1/11/2018    Tricia Eubanks    MRN: [id number]           Patient Information     Date Of Birth          1995        Visit Information        Provider Department      1/11/2018 1:30 PM Cassie Anderson PA-C [facility]      Today's Diagnoses     Morbid obesity with BMI of 40.0-44.9, adult (H)    -  1       Follow-ups after your visit        Your next 10 appointments already scheduled     Feb 19, 2018 10:00 AM CST   Return Visit with Samantha Echeverria MD   [facility]    [address] [place] [address]  [address]  [facility] [address] [phone]              Who to contact     If you have questions or need follow up information about today's clinic visit or your schedule please contact [facility] directly at [phone].  Normal or non-critical lab and imaging results will be communicated to you by KirkeWebhart, letter or phone within 4 business days after the clinic has received the results. If you do not hear from us within 7 days, please contact the clinic through Enkata Technologiest or phone. If you have a critical or abnormal lab result, we will notify you by phone as soon as possible.  Submit refill requests through iConclude or call your pharmacy and they will forward the refill request to us. Please allow 3 business days for your refill to be completed.          Additional Information About Your Visit        MyChart Information     iConclude gives you secure access to your electronic health record. If you see a primary care provider, you can also send messages to your care team and make appointments. If you have questions, please call your primary care clinic.  If you do not have a primary care provider, please call [phone] and they will assist you.        Care EveryWhere ID     This is your Care EveryWhere ID. This could be  "used by other organizations to access your [place] medical records  [id number]        Your Vitals Were     Pulse Temperature Respirations Height Pulse Oximetry BMI (Body Mass Index)    113 98.9  F (37.2  C) 20 5' 6\" (1.676 m) 98% 44.88 kg/m2       Blood Pressure from Last 3 Encounters:   01/11/18 146/82   11/13/17 120/76   11/07/17 110/72    Weight from Last 3 Encounters:   01/11/18 278 lb 1 oz (126.1 kg)   11/13/17 285 lb 1.6 oz (129.3 kg)   11/07/17 281 lb 3.2 oz (127.6 kg)              We Performed the Following     OP ROOMING NOTE TO GENI        Primary Care Provider Office Phone # Fax #    Carine Ross [phone] [address] [place] [address]        Equal Access to Services     [facility]: Hadii aad ku hadasho Soomaali, waaxda luqadaha, qaybta kaalmada adeegyada, waxay idiin hayaan jeanine westonaralayla singhn . So [facility] [phone].    ATENCIÓN: Si habla español, tiene a yap disposición servicios gratuitos de asistencia lingüística. Llame al [phone].    We comply with applicable federal civil rights laws and [place] laws. We do not discriminate on the basis of race, color, national origin, age, disability, sex, sexual orientation, or gender identity.            Thank you!     Thank you for choosing [facility]  for your care. Our goal is always to provide you with excellent care. Hearing back from our patients is one way we can continue to improve our services. Please take a few minutes to complete the written survey that you may receive in the mail after your visit with us. Thank you!             Your Updated Medication List - Protect others around you: Learn how to safely use, store and throw away your medicines at www.disposemymeds.org.          This list is accurate as of: 1/11/18  3:00 PM.  Always use your most recent med list.                   Brand Name Dispense Instructions for use Diagnosis    fish Oil 1200 MG " capsule      Take 2 capsules by mouth daily        FLUoxetine 40 MG capsule    PROzac    180 capsule    Take 2 capsules (80 mg) by mouth daily    Major depressive disorder, recurrent episode, moderate (H), Anxiety       multivitamin, therapeutic Tabs tablet      Take 1 tablet by mouth daily        ondansetron 4 MG tablet    ZOFRAN    18 tablet    Take 1 tablet (4 mg) by mouth every 8 hours as needed for nausea    Flu-like symptoms, Nausea       phentermine 37.5 MG tablet    ADIPEX-P    31 tablet    Take 1 tablet (37.5 mg) by mouth every morning (before breakfast)    Morbid obesity due to excess calories (H)       traZODone 50 MG tablet    DESYREL    60 tablet    Take 1-2 tablets ( mg) by mouth nightly as needed for sleep    Primary insomnia

## 2018-01-11 NOTE — PROGRESS NOTES
New Bariatric Surgery Consultation Note    RE: Tricia Eubanks  MR#: [id number]  : 1995    Dear Dr. Carine Ross MD;    I had the pleasure of seeing your patient, Tricia Eubanks, to evaluate her obesity and consider her for possible weight loss surgery.  After a brief overview with Tricia she disclosed she has borderline personality disorder as well has recent sobriety.  Unfortunately, due to lack of bariatric psychologists on staff I don't feel we can give her adequate psychological support for surgery at our program.      I did give some contact information for the [facility] bariatric program.  They have an excellent bariatric psychologist on staff named Dr. Chance Juan who does manage bariatric patients with concomitant complex mental health conditions. This may be an option for her in the future once she has maintained a longer period of sobriety.  Pt was appreciative of the contact information.      Sincerely,     Cassie Anderson PA-C

## 2018-01-11 NOTE — Clinical Note
Narayan Ross,   I had the pleasure of seeing your patient, Tricia Eubanks, to evaluate her obesity and consider her for possible weight loss surgery.  After a brief overview with Tricia she disclosed she has borderline personality disorder as well has recent sobriety.  Unfortunately, due to lack of bariatric psychologists on staff I don't feel we can give her adequate psychological support for surgery. She was very understanding.  Please feel free to contact me with questions.    Sincerely,    Cassie Anderson PA-C

## 2018-02-08 ENCOUNTER — TRANSFERRED RECORDS (OUTPATIENT)
Dept: HEALTH INFORMATION MANAGEMENT | Facility: CLINIC | Age: 23
End: 2018-02-08

## 2018-04-23 ENCOUNTER — MEDICAL CORRESPONDENCE (OUTPATIENT)
Dept: HEALTH INFORMATION MANAGEMENT | Facility: CLINIC | Age: 23
End: 2018-04-23

## 2018-05-01 ENCOUNTER — TRANSFERRED RECORDS (OUTPATIENT)
Dept: HEALTH INFORMATION MANAGEMENT | Facility: CLINIC | Age: 23
End: 2018-05-01

## 2018-05-09 DIAGNOSIS — F17.209 NICOTINE-RELATED DISORDER: ICD-10-CM

## 2018-05-09 PROCEDURE — 80307 DRUG TEST PRSMV CHEM ANLYZR: CPT | Mod: 90 | Performed by: SURGERY

## 2018-05-09 PROCEDURE — 99000 SPECIMEN HANDLING OFFICE-LAB: CPT | Performed by: SURGERY

## 2018-05-10 ENCOUNTER — TELEPHONE (OUTPATIENT)
Dept: PEDIATRICS | Facility: CLINIC | Age: 23
End: 2018-05-10

## 2018-05-10 DIAGNOSIS — F33.1 MAJOR DEPRESSIVE DISORDER, RECURRENT EPISODE, MODERATE (H): ICD-10-CM

## 2018-05-10 DIAGNOSIS — F41.9 ANXIETY: ICD-10-CM

## 2018-05-10 NOTE — TELEPHONE ENCOUNTER
Please assist patient with scheduling an office visit for an annual physical, PAP, with medication recheck.    Thank you    Arlette TRUJILLO RN, BSN, PHN  [place] [facility] RN

## 2018-05-10 NOTE — TELEPHONE ENCOUNTER
"Requested Prescriptions   Pending Prescriptions Disp Refills     FLUoxetine (PROZAC) 40 MG capsule [Pharmacy Med Name: FLUOXETINE HCL 40 MG CAPSULE]    Last Written Prescription Date:  11/13/2017  Last Fill Quantity: 180,  # refills: 1   Last office visit: 11/13/2017 with prescribing provider:  Carine Ross     Future Office Visit:     180 capsule 1     Sig: TAKE 2 CAPSULES (80 MG) BY MOUTH DAILY    SSRIs Protocol Failed    5/10/2018  1:33 AM       Failed - PHQ-9 score less than 5 in past 6 months    Please review last PHQ-9 score.     PHQ-9 SCORE 4/26/2017 8/25/2017 11/13/2017   Total Score MyChart - - -   Total Score 17 15 9     ALVARO-7 SCORE 4/26/2017 8/25/2017 11/13/2017   Total Score 13 10 14                Passed - Patient is age 18 or older       Passed - No active pregnancy on record       Passed - No positive pregnancy test in last 12 months       Passed - Recent (6 mo) or future (30 days) visit within the authorizing provider's specialty    Patient had office visit in the last 6 months or has a visit in the next 30 days with authorizing provider or within the authorizing provider's specialty.  See \"Patient Info\" tab in inbasket, or \"Choose Columns\" in Meds & Orders section of the refill encounter.              "

## 2018-05-10 NOTE — TELEPHONE ENCOUNTER
Routing refill request to provider for review/approval because:  Patient needs to be seen because:  PHQ-9>5, patient due for px and pap    Arlette TRUJILLO RN, BSN, PHN  [place] [facility] RN

## 2018-05-11 LAB — COTININE UR QL: NEGATIVE NG/ML

## 2018-05-11 RX ORDER — FLUOXETINE 40 MG/1
CAPSULE ORAL
Qty: 60 CAPSULE | Refills: 0 | Status: SHIPPED | OUTPATIENT
Start: 2018-05-11 | End: 2018-08-27

## 2018-05-24 ENCOUNTER — OFFICE VISIT (OUTPATIENT)
Dept: PEDIATRICS | Facility: CLINIC | Age: 23
End: 2018-05-24
Payer: COMMERCIAL

## 2018-05-24 VITALS
HEART RATE: 94 BPM | HEIGHT: 66 IN | TEMPERATURE: 97.9 F | WEIGHT: 268.3 LBS | DIASTOLIC BLOOD PRESSURE: 76 MMHG | SYSTOLIC BLOOD PRESSURE: 124 MMHG | OXYGEN SATURATION: 98 % | BODY MASS INDEX: 43.12 KG/M2

## 2018-05-24 DIAGNOSIS — F41.9 ANXIETY: ICD-10-CM

## 2018-05-24 DIAGNOSIS — Z11.3 SCREEN FOR STD (SEXUALLY TRANSMITTED DISEASE): ICD-10-CM

## 2018-05-24 DIAGNOSIS — F33.1 MAJOR DEPRESSIVE DISORDER, RECURRENT EPISODE, MODERATE (H): ICD-10-CM

## 2018-05-24 DIAGNOSIS — Z12.4 SCREENING FOR MALIGNANT NEOPLASM OF CERVIX: ICD-10-CM

## 2018-05-24 DIAGNOSIS — R79.89 ELEVATED LIVER FUNCTION TESTS: ICD-10-CM

## 2018-05-24 DIAGNOSIS — Z01.419 ENCOUNTER FOR GYNECOLOGICAL EXAMINATION WITHOUT ABNORMAL FINDING: Primary | ICD-10-CM

## 2018-05-24 DIAGNOSIS — E66.01 MORBID OBESITY (H): ICD-10-CM

## 2018-05-24 DIAGNOSIS — Z13.220 SCREENING CHOLESTEROL LEVEL: ICD-10-CM

## 2018-05-24 LAB
ALBUMIN SERPL-MCNC: 3.7 G/DL (ref 3.4–5)
ALP SERPL-CCNC: 75 U/L (ref 40–150)
ALT SERPL W P-5'-P-CCNC: 28 U/L (ref 0–50)
ANION GAP SERPL CALCULATED.3IONS-SCNC: 6 MMOL/L (ref 3–14)
AST SERPL W P-5'-P-CCNC: 19 U/L (ref 0–45)
BILIRUB SERPL-MCNC: 0.3 MG/DL (ref 0.2–1.3)
BUN SERPL-MCNC: 10 MG/DL (ref 7–30)
CALCIUM SERPL-MCNC: 8.7 MG/DL (ref 8.5–10.1)
CHLORIDE SERPL-SCNC: 103 MMOL/L (ref 94–109)
CHOLEST SERPL-MCNC: 196 MG/DL
CO2 SERPL-SCNC: 27 MMOL/L (ref 20–32)
CREAT SERPL-MCNC: 0.57 MG/DL (ref 0.52–1.04)
ERYTHROCYTE [DISTWIDTH] IN BLOOD BY AUTOMATED COUNT: 12 % (ref 10–15)
GFR SERPL CREATININE-BSD FRML MDRD: >90 ML/MIN/1.7M2
GLUCOSE SERPL-MCNC: 88 MG/DL (ref 70–99)
HCT VFR BLD AUTO: 37.9 % (ref 35–47)
HDLC SERPL-MCNC: 51 MG/DL
HGB BLD-MCNC: 12.4 G/DL (ref 11.7–15.7)
HIV 1+2 AB+HIV1 P24 AG SERPL QL IA: NONREACTIVE
LDLC SERPL CALC-MCNC: 117 MG/DL
MCH RBC QN AUTO: 31.9 PG (ref 26.5–33)
MCHC RBC AUTO-ENTMCNC: 32.7 G/DL (ref 31.5–36.5)
MCV RBC AUTO: 97 FL (ref 78–100)
NONHDLC SERPL-MCNC: 145 MG/DL
PLATELET # BLD AUTO: 352 10E9/L (ref 150–450)
POTASSIUM SERPL-SCNC: 4.2 MMOL/L (ref 3.4–5.3)
PROT SERPL-MCNC: 8.1 G/DL (ref 6.8–8.8)
RBC # BLD AUTO: 3.89 10E12/L (ref 3.8–5.2)
SODIUM SERPL-SCNC: 136 MMOL/L (ref 133–144)
TRIGL SERPL-MCNC: 141 MG/DL
WBC # BLD AUTO: 9.9 10E9/L (ref 4–11)

## 2018-05-24 PROCEDURE — 87591 N.GONORRHOEAE DNA AMP PROB: CPT | Performed by: INTERNAL MEDICINE

## 2018-05-24 PROCEDURE — 99395 PREV VISIT EST AGE 18-39: CPT | Performed by: INTERNAL MEDICINE

## 2018-05-24 PROCEDURE — 86780 TREPONEMA PALLIDUM: CPT | Performed by: INTERNAL MEDICINE

## 2018-05-24 PROCEDURE — 80061 LIPID PANEL: CPT | Performed by: INTERNAL MEDICINE

## 2018-05-24 PROCEDURE — 80053 COMPREHEN METABOLIC PANEL: CPT | Performed by: INTERNAL MEDICINE

## 2018-05-24 PROCEDURE — 87389 HIV-1 AG W/HIV-1&-2 AB AG IA: CPT | Performed by: INTERNAL MEDICINE

## 2018-05-24 PROCEDURE — 87491 CHLMYD TRACH DNA AMP PROBE: CPT | Performed by: INTERNAL MEDICINE

## 2018-05-24 PROCEDURE — 36415 COLL VENOUS BLD VENIPUNCTURE: CPT | Performed by: INTERNAL MEDICINE

## 2018-05-24 PROCEDURE — 85027 COMPLETE CBC AUTOMATED: CPT | Performed by: INTERNAL MEDICINE

## 2018-05-24 PROCEDURE — G0145 SCR C/V CYTO,THINLAYER,RESCR: HCPCS | Performed by: INTERNAL MEDICINE

## 2018-05-24 ASSESSMENT — ENCOUNTER SYMPTOMS
DIZZINESS: 0
HEARTBURN: 0
CONSTIPATION: 0
JOINT SWELLING: 0
FEVER: 0
HEMATOCHEZIA: 0
SORE THROAT: 0
DYSURIA: 0
ABDOMINAL PAIN: 0
CHILLS: 0
EYE PAIN: 0
ARTHRALGIAS: 0
HEMATURIA: 0
SHORTNESS OF BREATH: 0
HEADACHES: 0
DIARRHEA: 0
PALPITATIONS: 0
NERVOUS/ANXIOUS: 1
WEAKNESS: 0
PARESTHESIAS: 0
MYALGIAS: 0
NAUSEA: 0
FREQUENCY: 0
COUGH: 0

## 2018-05-24 ASSESSMENT — ANXIETY QUESTIONNAIRES
IF YOU CHECKED OFF ANY PROBLEMS ON THIS QUESTIONNAIRE, HOW DIFFICULT HAVE THESE PROBLEMS MADE IT FOR YOU TO DO YOUR WORK, TAKE CARE OF THINGS AT HOME, OR GET ALONG WITH OTHER PEOPLE: NOT DIFFICULT AT ALL
GAD7 TOTAL SCORE: 8
1. FEELING NERVOUS, ANXIOUS, OR ON EDGE: MORE THAN HALF THE DAYS
6. BECOMING EASILY ANNOYED OR IRRITABLE: NOT AT ALL
7. FEELING AFRAID AS IF SOMETHING AWFUL MIGHT HAPPEN: NOT AT ALL
3. WORRYING TOO MUCH ABOUT DIFFERENT THINGS: MORE THAN HALF THE DAYS
2. NOT BEING ABLE TO STOP OR CONTROL WORRYING: MORE THAN HALF THE DAYS
5. BEING SO RESTLESS THAT IT IS HARD TO SIT STILL: SEVERAL DAYS

## 2018-05-24 ASSESSMENT — PATIENT HEALTH QUESTIONNAIRE - PHQ9: 5. POOR APPETITE OR OVEREATING: SEVERAL DAYS

## 2018-05-24 NOTE — PATIENT INSTRUCTIONS
1. Pap today  2. Blood tests: cholesterol, diabetes screen, liver function, kidney function and blood counts  3. Check to see if you need a full pre-op before surgery
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
## 2018-05-24 NOTE — MR AVS SNAPSHOT
After Visit Summary   5/24/2018    Tricia Eubanks    MRN: [id number]           Patient Information     Date Of Birth          1995        Visit Information        Provider Department      5/24/2018 8:00 AM Carine Ross MD [facility]        Today's Diagnoses     Encounter for gynecological examination without abnormal finding    -  1    Elevated liver function tests        Screening for malignant neoplasm of cervix        Screening cholesterol level        Morbid obesity (H)          Care Instructions    1. Pap today  2. Blood tests: cholesterol, diabetes screen, liver function, kidney function and blood counts  3. Check to see if you need a full pre-op before surgery          Follow-ups after your visit        Follow-up notes from your care team     Return in about 1 year (around 5/24/2019) for Physical Exam.      Who to contact     If you have questions or need follow up information about today's clinic visit or your schedule please contact [facility] directly at [phone].  Normal or non-critical lab and imaging results will be communicated to you by ENEFprohart, letter or phone within 4 business days after the clinic has received the results. If you do not hear from us within 7 days, please contact the clinic through ENEFprohart or phone. If you have a critical or abnormal lab result, we will notify you by phone as soon as possible.  Submit refill requests through NPC III or call your pharmacy and they will forward the refill request to us. Please allow 3 business days for your refill to be completed.          Additional Information About Your Visit        MyChart Information     NPC III gives you secure access to your electronic health record. If you see a primary care provider, you can also send messages to your care team and make appointments. If you have questions, please call your primary care clinic.  If you do not have a primary care provider, please call  "[phone] and they will assist you.        Care EveryWhere ID     This is your Care EveryWhere ID. This could be used by other organizations to access your [facility] medical records  [id number]        Your Vitals Were     Pulse Temperature Height Last Period Pulse Oximetry BMI (Body Mass Index)    94 97.9  F (36.6  C) (Tympanic) 5' 6\" (1.676 m) 05/18/2018 (Exact Date) 98% 43.3 kg/m2       Blood Pressure from Last 3 Encounters:   05/24/18 124/76   01/11/18 146/82   11/13/17 120/76    Weight from Last 3 Encounters:   05/24/18 268 lb 4.8 oz (121.7 kg)   01/11/18 278 lb 1 oz (126.1 kg)   11/13/17 285 lb 1.6 oz (129.3 kg)              We Performed the Following     CBC with platelets     Comprehensive metabolic panel     DEPRESSION ACTION PLAN (DAP)     Lipid panel reflex to direct LDL Fasting     Pap imaged thin layer screen reflex to HPV if ASCUS - recommend age 25 - 29        Primary Care Provider Office Phone # Fax #    Carine Ross [phone] [address] [place] [address]        Equal Access to Services     NOAH MACDONALD AH: Hadii vonnie rouseo Somandi, waaxda luqadaha, qaybta kaalmada ademercedesyada, viviana lomax. So [facility] [phone].    ATENCIÓN: Si habla español, tiene a yap disposición servicios gratuitos de asistencia lingüística. Llame al [phone].    We comply with applicable federal civil rights laws and [place] laws. We do not discriminate on the basis of race, color, national origin, age, disability, sex, sexual orientation, or gender identity.            Thank you!     Thank you for choosing [facility]  for your care. Our goal is always to provide you with excellent care. Hearing back from our patients is one way we can continue to improve our services. Please take a few minutes to complete the written survey that you may receive in the mail after your visit with us. Thank you!             Your Updated Medication " List - Protect others around you: Learn how to safely use, store and throw away your medicines at www.disposemymeds.org.          This list is accurate as of 5/24/18  8:35 AM.  Always use your most recent med list.                   Brand Name Dispense Instructions for use Diagnosis    fish Oil 1200 MG capsule      Take 2 capsules by mouth daily        FLUoxetine 40 MG capsule    PROzac    60 capsule    TAKE 2 CAPSULES (80 MG) BY MOUTH DAILY    Major depressive disorder, recurrent episode, moderate (H), Anxiety       multivitamin, therapeutic Tabs tablet      Take 1 tablet by mouth daily        traZODone 50 MG tablet    DESYREL    60 tablet    Take 1-2 tablets ( mg) by mouth nightly as needed for sleep    Primary insomnia

## 2018-05-24 NOTE — LETTER
My Depression Action Plan  Name: Tricia Eubanks   Date of Birth 1995  Date: 5/23/2018    My doctor: Carine Ross   My clinic: [address]  [address]  [facility] [address] [phone]          GREEN    ZONE   Good Control    What it looks like:     Things are going generally well. You have normal up s and down s. You may even feel depressed from time to time, but bad moods usually last less than a day.   What you need to do:  1. Continue to care for yourself (see self care plan)  2. Check your depression survival kit and update it as needed  3. Follow your physician s recommendations including any medication.  4. Do not stop taking medication unless you consult with your physician first.           YELLOW         ZONE Getting Worse    What it looks like:     Depression is starting to interfere with your life.     It may be hard to get out of bed; you may be starting to isolate yourself from others.    Symptoms of depression are starting to last most all day and this has happened for several days.     You may have suicidal thoughts but they are not constant.   What you need to do:     1. Call your care team, your response to treatment will improve if you keep your care team informed of your progress. Yellow periods are signs an adjustment may need to be made.     2. Continue your self-care, even if you have to fake it!    3. Talk to someone in your support network    4. Open up your depression survival kit           RED    ZONE Medical Alert - Get Help    What it looks like:     Depression is seriously interfering with your life.     You may experience these or other symptoms: You can t get out of bed most days, can t work or engage in other necessary activities, you have trouble taking care of basic hygiene, or basic responsibilities, thoughts of suicide or death that will not go away, self-injurious behavior.     What you need to do:  1. Call  your care team and request a same-day appointment. If they are not available (weekends or after hours) call your local crisis line, emergency room or 911.            Depression Self Care Plan / Survival Kit    Self-Care for Depression  Here s the deal. Your body and mind are really not as separate as most people think.  What you do and think affects how you feel and how you feel influences what you do and think. This means if you do things that people who feel good do, it will help you feel better.  Sometimes this is all it takes.  There is also a place for medication and therapy depending on how severe your depression is, so be sure to consult with your medical provider and/ or Behavioral Health Consultant if your symptoms are worsening or not improving.     In order to better manage my stress, I will:    Exercise  Get some form of exercise, every day. This will help reduce pain and release endorphins, the  feel good  chemicals in your brain. This is almost as good as taking antidepressants!  This is not the same as joining a gym and then never going! (they count on that by the way ) It can be as simple as just going for a walk or doing some gardening, anything that will get you moving.      Hygiene   Maintain good hygiene (Get out of bed in the morning, Make your bed, Brush your teeth, Take a shower, and Get dressed like you were going to work, even if you are unemployed).  If your clothes don't fit try to get ones that do.    Diet  I will strive to eat foods that are good for me, drink plenty of water, and avoid excessive sugar, caffeine, alcohol, and other mood-altering substances.  Some foods that are helpful in depression are: complex carbohydrates, B vitamins, flaxseed, fish or fish oil, fresh fruits and vegetables.    Psychotherapy  I agree to participate in Individual Therapy (if recommended).    Medication  If prescribed medications, I agree to take them.  Missing doses can result in serious side effects.   I understand that drinking alcohol, or other illicit drug use, may cause potential side effects.  I will not stop my medication abruptly without first discussing it with my provider.    Staying Connected With Others  I will stay in touch with my friends, family members, and my primary care provider/team.    Use your imagination  Be creative.  We all have a creative side; it doesn t matter if it s oil painting, sand castles, or mud pies! This will also kick up the endorphins.    Witness Beauty  (AKA stop and smell the roses) Take a look outside, even in mid-winter. Notice colors, textures. Watch the squirrels and birds.     Service to others  Be of service to others.  There is always someone else in need.  By helping others we can  get out of ourselves  and remember the really important things.  This also provides opportunities for practicing all the other parts of the program.    Humor  Laugh and be silly!  Adjust your TV habits for less news and crime-drama and more comedy.    Control your stress  Try breathing deep, massage therapy, biofeedback, and meditation. Find time to relax each day.     My support system    Clinic Contact:  Phone number:    Contact 1:  Phone number:    Contact 2:  Phone number:    Druze/:  Phone number:    Therapist:  Phone number:    [facility] crisis center:    Phone number:    Other community support:  Phone number:

## 2018-05-25 LAB
C TRACH DNA SPEC QL NAA+PROBE: NEGATIVE
N GONORRHOEA DNA SPEC QL NAA+PROBE: NEGATIVE
SPECIMEN SOURCE: NORMAL
SPECIMEN SOURCE: NORMAL
T PALLIDUM AB SER QL: NONREACTIVE

## 2018-05-25 ASSESSMENT — PATIENT HEALTH QUESTIONNAIRE - PHQ9: SUM OF ALL RESPONSES TO PHQ QUESTIONS 1-9: 10

## 2018-05-25 ASSESSMENT — ANXIETY QUESTIONNAIRES: GAD7 TOTAL SCORE: 8

## 2018-05-30 LAB
COPATH REPORT: NORMAL
PAP: NORMAL

## 2018-05-31 ENCOUNTER — TELEPHONE (OUTPATIENT)
Dept: PEDIATRICS | Facility: CLINIC | Age: 23
End: 2018-05-31

## 2018-05-31 ENCOUNTER — TRANSFERRED RECORDS (OUTPATIENT)
Dept: HEALTH INFORMATION MANAGEMENT | Facility: CLINIC | Age: 23
End: 2018-05-31

## 2018-05-31 NOTE — TELEPHONE ENCOUNTER
"Pt calling to notify that  to add a note to her OV notes that provider is \"approving a clearance for surgery\" and refax to [facility] as below.    Pablo, RN  Triage Nurse          "

## 2018-05-31 NOTE — TELEPHONE ENCOUNTER
Received call from [facility]. They also needed copy of recent OV notes faxed to [phone]. Notes faxed.  Christina Ribeiro LPN

## 2018-05-31 NOTE — TELEPHONE ENCOUNTER
The Pt called in to report that her Bariatric surgeon advised her she DOES NOT need a separate pre-op visit to qualify for her surgery.   She reports she does not need an EKG, just need LOV notes from 5/24/18.     She will call back into the clinic with the fax number to send LOV note to. TC, please print and fax OV note once fax number is provided.     Kori Kimble RN -- [facility] Workforce

## 2018-05-31 NOTE — TELEPHONE ENCOUNTER
I told the patient at her physical that it was not a pre-op. IF she needed a PRE-OP she needed to schedule another appointment before her surgery. We did not discuss any pre-op questions at her appointment. I am not comfortable addending the physical for an adult to make it into a pre-op. She needs to schedule a pre-op. Please let her know.    Carine Ross MD  Internal Medicine/Pediatrics

## 2018-06-01 ENCOUNTER — OFFICE VISIT (OUTPATIENT)
Dept: PEDIATRICS | Facility: CLINIC | Age: 23
End: 2018-06-01
Payer: COMMERCIAL

## 2018-06-01 VITALS
SYSTOLIC BLOOD PRESSURE: 128 MMHG | HEART RATE: 90 BPM | BODY MASS INDEX: 42.02 KG/M2 | DIASTOLIC BLOOD PRESSURE: 78 MMHG | HEIGHT: 66 IN | TEMPERATURE: 98.3 F | WEIGHT: 261.5 LBS | OXYGEN SATURATION: 99 %

## 2018-06-01 DIAGNOSIS — E66.01 MORBID OBESITY DUE TO EXCESS CALORIES (H): ICD-10-CM

## 2018-06-01 DIAGNOSIS — Z01.818 PREOP GENERAL PHYSICAL EXAM: Primary | ICD-10-CM

## 2018-06-01 LAB
BETA HCG QUAL IFA URINE: NEGATIVE
HGB BLD-MCNC: 12.8 G/DL (ref 11.7–15.7)

## 2018-06-01 PROCEDURE — 84703 CHORIONIC GONADOTROPIN ASSAY: CPT | Performed by: INTERNAL MEDICINE

## 2018-06-01 PROCEDURE — 99214 OFFICE O/P EST MOD 30 MIN: CPT | Performed by: INTERNAL MEDICINE

## 2018-06-01 PROCEDURE — 85018 HEMOGLOBIN: CPT | Performed by: INTERNAL MEDICINE

## 2018-06-01 PROCEDURE — 93000 ELECTROCARDIOGRAM COMPLETE: CPT | Performed by: INTERNAL MEDICINE

## 2018-06-01 PROCEDURE — 36415 COLL VENOUS BLD VENIPUNCTURE: CPT | Performed by: INTERNAL MEDICINE

## 2018-06-01 NOTE — TELEPHONE ENCOUNTER
Called patient, informed of below. Her bariatric surgery is scheduled on 6/4/18.     Scheduled for today with Dr. Oscar.    Next 5 appointments (look out 90 days)     Jun 01, 2018  1:40 PM CDT   [facility] physical with [facility] Oscar MD   [facility]    [address] [place] [id number] [phone]

## 2018-06-01 NOTE — MR AVS SNAPSHOT
After Visit Summary   6/1/2018    Tricia Eubanks    MRN: [id number]           Patient Information     Date Of Birth          1995        Visit Information        Provider Department      6/1/2018 1:40 PM Shannon Oscar MD [facility]        Today's Diagnoses     Preop general physical exam    -  1    Morbid obesity due to excess calories (H)          Care Instructions      Before Your Surgery      Call your surgeon if there is any change in your health. This includes signs of a cold or flu (such as a sore throat, runny nose, cough, rash or fever).    Do not smoke, drink alcohol or take over the counter medicine (unless your surgeon or primary care doctor tells you to) for the 24 hours before and after surgery.    If you take prescribed drugs: Follow your doctor s orders about which medicines to take and which to stop until after surgery.    Eating and drinking prior to surgery: follow the instructions from your surgeon    Take a shower or bath the night before surgery. Use the soap your surgeon gave you to gently clean your skin. If you do not have soap from your surgeon, use your regular soap. Do not shave or scrub the surgery site.  Wear clean pajamas and have clean sheets on your bed.           Follow-ups after your visit        Who to contact     If you have questions or need follow up information about today's clinic visit or your schedule please contact [facility] directly at [phone].  Normal or non-critical lab and imaging results will be communicated to you by MyChart, letter or phone within 4 business days after the clinic has received the results. If you do not hear from us within 7 days, please contact the clinic through MyChart or phone. If you have a critical or abnormal lab result, we will notify you by phone as soon as possible.  Submit refill requests through [employer] or call your pharmacy and they will forward the refill request to us. Please  "allow 3 business days for your refill to be completed.          Additional Information About Your Visit        MyChart Information     [employer] gives you secure access to your electronic health record. If you see a primary care provider, you can also send messages to your care team and make appointments. If you have questions, please call your primary care clinic.  If you do not have a primary care provider, please call [phone] and they will assist you.        Care EveryWhere ID     This is your Care EveryWhere ID. This could be used by other organizations to access your [place] medical records  [id number]        Your Vitals Were     Pulse Temperature Height Last Period Pulse Oximetry BMI (Body Mass Index)    90 98.3  F (36.8  C) (Oral) 5' 6\" (1.676 m) 05/18/2018 (Exact Date) 99% 42.21 kg/m2       Blood Pressure from Last 3 Encounters:   06/01/18 128/78   05/24/18 124/76   01/11/18 146/82    Weight from Last 3 Encounters:   06/01/18 261 lb 8 oz (118.6 kg)   05/24/18 268 lb 4.8 oz (121.7 kg)   01/11/18 278 lb 1 oz (126.1 kg)              We Performed the Following     EKG 12-lead complete w/read - Clinics     HCL URINE PREGNANCY TEST     HEMOGLOBIN        Primary Care Provider Office Phone # Fax #    Carine Ross [phone] [address] [place] [address]        Equal Access to Services     [facility]: Hadii aad ku hadasho Soomaali, waaxda luqadaha, qaybta kaalmada adeegyada, waxay virgil betts . So [facility] [phone].    ATENCIÓN: Si habla español, tiene a yap disposición servicios gratuitos de asistencia lingüística. Llame al [phone].    We comply with applicable federal civil rights laws and [place] laws. We do not discriminate on the basis of race, color, national origin, age, disability, sex, sexual orientation, or gender identity.            Thank you!     Thank you for choosing [facility]  for your care. Our goal is " always to provide you with excellent care. Hearing back from our patients is one way we can continue to improve our services. Please take a few minutes to complete the written survey that you may receive in the mail after your visit with us. Thank you!             Your Updated Medication List - Protect others around you: Learn how to safely use, store and throw away your medicines at www.disposemymeds.org.          This list is accurate as of 6/1/18  2:12 PM.  Always use your most recent med list.                   Brand Name Dispense Instructions for use Diagnosis    fish Oil 1200 MG capsule      Take 2 capsules by mouth daily        FLUoxetine 40 MG capsule    PROzac    60 capsule    TAKE 2 CAPSULES (80 MG) BY MOUTH DAILY    Major depressive disorder, recurrent episode, moderate (H), Anxiety       multivitamin, therapeutic Tabs tablet      Take 1 tablet by mouth daily        traZODone 50 MG tablet    DESYREL    60 tablet    Take 1-2 tablets ( mg) by mouth nightly as needed for sleep    Primary insomnia

## 2018-06-01 NOTE — PROGRESS NOTES
[facility]  [address]  [address]  [place] [address]  [phone]  Dept: [phone]    PRE-OP EVALUATION:  Today's date: 2018    Tricia Eubanks (: 1995) presents for pre-operative evaluation assessment as requested by Dr. Suazo.  She requires evaluation and anesthesia risk assessment prior to undergoing surgery/procedure for treatment of gastric bypass .    Fax number for surgical facility:   Primary Physician: Carine Ross  Type of Anesthesia Anticipated: General    Patient has a Health Care Directive or Living Will:  NO    Preop Questions 2018   Who is doing your surgery? Venu n y  gastric bypass   Date of Surgery/Procedure: 18   Facility or Hospital where procedure/surgery will be performed: [facility]   1.  Do you have a history of Heart attack, stroke, stent, coronary bypass surgery, or other heart surgery? No   2.  Do you ever have any pain or discomfort in your chest? No   3.  Do you have a history of  Heart Failure? No   4.   Are you troubled by shortness of breath when:  walking on a level surface, or up a slight hill, or at night? YES - related to obesity    5.  Do you currently have a cold, bronchitis or other respiratory infection? No   6.  Do you have a cough, shortness of breath, or wheezing? No   7.  Do you sometimes get pains in the calves of your legs when you walk? No   8. Do you or anyone in your family have previous history of blood clots? YES - DVT in grandfather   9.  Do you or does anyone in your family have a serious bleeding problem such as prolonged bleeding following surgeries or cuts? NO   10. Have you ever had problems with anemia or been told to take iron pills? YES - chronic anemia, on iron pills    11. Have you had any abnormal blood loss such as black, tarry or bloody stools, or abnormal vaginal bleeding? No   12. Have you ever had a blood transfusion? No   13. Have you or any of your relatives ever  had problems with anesthesia? No   14. Do you have sleep apnea, excessive snoring or daytime drowsiness? No   15. Do you have any prosthetic heart valves? No   16. Do you have prosthetic joints? No   17. Is there any chance that you may be pregnant? No         HPI:     HPI related to upcoming procedure: morbid obesity       DEPRESSION - Patient has a long history of Depression of moderate severity requiring medication for control with recent symptoms being stable..Current symptoms of depression include none.                                                                                                                                                                                    .    MEDICAL HISTORY:     Patient Active Problem List    Diagnosis Date Noted     Anxiety 04/26/2017     Priority: Medium     Morbid obesity due to excess calories (H) 08/02/2016     Priority: Medium     Body mass index is 46.85 kg/(m^2).         Abnormal weight gain 08/02/2016     Priority: Medium     Major depressive disorder, recurrent episode, moderate (H) 11/17/2015     Priority: Medium     Eczema, unspecified eczema 11/17/2015     Priority: Medium     Smoking 11/12/2014     Priority: Medium     Hemoptysis 11/12/2014     Priority: Medium     IUD (intrauterine device) in place 08/07/2014     Priority: Medium     Placed 2/2011       Alcohol abuse 08/07/2014     Priority: Medium      History reviewed. No pertinent past medical history.  History reviewed. No pertinent surgical history.  Current Outpatient Prescriptions   Medication Sig Dispense Refill     FLUoxetine (PROZAC) 40 MG capsule TAKE 2 CAPSULES (80 MG) BY MOUTH DAILY 60 capsule 0     multivitamin, therapeutic (THERA-VIT) TABS Take 1 tablet by mouth daily       Omega-3 Fatty Acids (FISH OIL) 1200 MG CAPS Take 2 capsules by mouth daily       traZODone (DESYREL) 50 MG tablet Take 1-2 tablets ( mg) by mouth nightly as needed for sleep (Patient not taking: Reported on  "1/11/2018) 60 tablet 1     OTC products: None, except as noted above    Allergies   Allergen Reactions     Nickel Rash      Latex Allergy: NO    Social History   Substance Use Topics     Smoking status: Former Smoker     Types: Cigarettes     Smokeless tobacco: Former User     Quit date: 4/27/2018     Alcohol use No      Comment: once every 2 weeks     History   Drug Use No       REVIEW OF SYSTEMS:   CONSTITUTIONAL: NEGATIVE for fever, chills, change in weight  INTEGUMENTARY/SKIN: NEGATIVE for worrisome rashes, moles or lesions  EYES: NEGATIVE for vision changes or irritation  ENT/MOUTH: NEGATIVE for ear, mouth and throat problems  RESP: NEGATIVE for significant cough or SOB  BREAST: NEGATIVE for masses, tenderness or discharge  CV: NEGATIVE for chest pain, palpitations or peripheral edema  GI: NEGATIVE for nausea, abdominal pain, heartburn, or change in bowel habits  : NEGATIVE for frequency, dysuria, or hematuria  MUSCULOSKELETAL: NEGATIVE for significant arthralgias or myalgia  NEURO: NEGATIVE for weakness, dizziness or paresthesias  ENDOCRINE: NEGATIVE for temperature intolerance, skin/hair changes  HEME: NEGATIVE for bleeding problems  PSYCHIATRIC: NEGATIVE for changes in mood or affect    EXAM:   /78 (BP Location: Right arm, Patient Position: Sitting, Cuff Size: Adult Regular)  Pulse 90  Temp 98.3  F (36.8  C) (Oral)  Ht 5' 6\" (1.676 m)  Wt 261 lb 8 oz (118.6 kg)  LMP 05/18/2018 (Exact Date)  SpO2 99%  BMI 42.21 kg/m2    GENERAL APPEARANCE: healthy, alert and no distress     EYES: EOMI, PERRL     HENT: ear canals and TM's normal and nose and mouth without ulcers or lesions     NECK: no adenopathy, no asymmetry, masses, or scars and thyroid normal to palpation     RESP: lungs clear to auscultation - no rales, rhonchi or wheezes     CV: regular rates and rhythm, normal S1 S2, no S3 or S4 and no murmur, click or rub     ABDOMEN:  soft, nontender, no HSM or masses and bowel sounds normal     MS: " extremities normal- no gross deformities noted, no evidence of inflammation in joints, FROM in all extremities.     SKIN: no suspicious lesions or rashes     NEURO: Normal strength and tone, sensory exam grossly normal, mentation intact and speech normal     PSYCH: mentation appears normal. and affect normal/bright     LYMPHATICS: No cervical adenopathy    DIAGNOSTICS:   EKG: appears normal, NSR, normal axis, normal intervals, no acute ST/T changes c/w ischemia, no LVH by voltage criteria, unchanged from previous tracings    Recent Labs   Lab Test  05/24/18   0852  08/25/17   1353   03/24/16   1356   HGB  12.4  13.2   --    --    PLT  352  318   --    --    NA  136  140   < >   --    POTASSIUM  4.2  4.4   < >   --    CR  0.57  0.64   < >   --    A1C   --    --    --   5.4    < > = values in this interval not displayed.        IMPRESSION:   Reason for surgery/procedure: morbid obesity   Diagnosis/reason for consult: pre op     The proposed surgical procedure is considered INTERMEDIATE risk.    REVISED CARDIAC RISK INDEX  The patient has the following serious cardiovascular risks for perioperative complications such as (MI, PE, VFib and 3  AV Block):  No serious cardiac risks  INTERPRETATION: 0 risks: Class I (very low risk - 0.4% complication rate)    The patient has the following additional risks for perioperative complications:  Morbid obesity  History of alcohol overuse      ICD-10-CM    1. Preop general physical exam Z01.818    2. Morbid obesity due to excess calories (H) E66.01 EKG 12-lead complete w/read - Clinics       RECOMMENDATIONS:         --Patient is to take all scheduled medications on the day of surgery EXCEPT for modifications listed below.    APPROVAL GIVEN to proceed with proposed procedure, without further diagnostic evaluation       Signed Electronically by: Shannon Oscar MD    Copy of this evaluation report is provided to requesting physician.    [place] Preop Guidelines    Revised  Cardiac Risk Index

## 2018-06-06 ENCOUNTER — RADIANT APPOINTMENT (OUTPATIENT)
Dept: GENERAL RADIOLOGY | Facility: CLINIC | Age: 23
End: 2018-06-06
Attending: INTERNAL MEDICINE
Payer: COMMERCIAL

## 2018-06-06 ENCOUNTER — OFFICE VISIT (OUTPATIENT)
Dept: PEDIATRICS | Facility: CLINIC | Age: 23
End: 2018-06-06
Payer: COMMERCIAL

## 2018-06-06 ENCOUNTER — TRANSFERRED RECORDS (OUTPATIENT)
Dept: HEALTH INFORMATION MANAGEMENT | Facility: CLINIC | Age: 23
End: 2018-06-06

## 2018-06-06 VITALS
BODY MASS INDEX: 42.14 KG/M2 | SYSTOLIC BLOOD PRESSURE: 110 MMHG | DIASTOLIC BLOOD PRESSURE: 66 MMHG | OXYGEN SATURATION: 94 % | TEMPERATURE: 97.9 F | HEIGHT: 66 IN | HEART RATE: 102 BPM | WEIGHT: 262.2 LBS

## 2018-06-06 DIAGNOSIS — Z98.890 POSTOPERATIVE STATE: ICD-10-CM

## 2018-06-06 DIAGNOSIS — K56.0 ADYNAMIC ILEUS (H): ICD-10-CM

## 2018-06-06 DIAGNOSIS — R10.84 ABDOMINAL PAIN, GENERALIZED: ICD-10-CM

## 2018-06-06 DIAGNOSIS — R10.84 ABDOMINAL PAIN, GENERALIZED: Primary | ICD-10-CM

## 2018-06-06 LAB
ALT SERPL-CCNC: 32 IU/L (ref 8–45)
AST SERPL-CCNC: 25 IU/L (ref 2–40)
CREAT SERPL-MCNC: 0.6 MG/DL (ref 0.57–1.11)
GFR SERPL CREATININE-BSD FRML MDRD: >60 ML/MIN/1.73M2
GLUCOSE SERPL-MCNC: 89 MG/DL (ref 65–100)
POTASSIUM SERPL-SCNC: 3.6 MMOL/L (ref 3.5–5)

## 2018-06-06 PROCEDURE — 74019 RADEX ABDOMEN 2 VIEWS: CPT

## 2018-06-06 PROCEDURE — 99214 OFFICE O/P EST MOD 30 MIN: CPT | Mod: GC | Performed by: INTERNAL MEDICINE

## 2018-06-06 NOTE — PROGRESS NOTES
"  SUBJECTIVE:   Tricia Eubanks is a 23 year old female who presents to clinic today for the following health issues:    Abdominal pain  Underwent gastric sleeve surgery on Monday 6/4/18. Discharged from hospital ([facility]) yesterday.   Has had abdominal pain since the surgery. Was using 3 tabs of dilaudid (6 mg) every 3 or 4 hours. Was only given 15 tabs so she ran out.     Describes the abdominal pain as \"all over\" and has been gradually worsening since her last dose of pain medication about 3 hours ago.   Last BM was on Daniel 6/3.  Has been passing gas.  Did not have a bowel movement before she left the hospital.  No nausea. Eating okay.  Has been on a liquid diet (broth).  No nausea or vomiting.  No fevers or chills.  Has not been on any stool softeners until today-started Dulcolax today.    Appointment with surgeon scheduled for this coming Monday.    Problem list and histories reviewed & adjusted, as indicated.  Additional history: as documented    Patient Active Problem List   Diagnosis     IUD (intrauterine device) in place     Alcohol abuse     Smoking     Hemoptysis     Major depressive disorder, recurrent episode, moderate (H)     Eczema, unspecified eczema     Morbid obesity due to excess calories (H)     Abnormal weight gain     Anxiety     Past Surgical History:   Procedure Laterality Date     LAPAROSCOPIC GASTRIC SLEEVE  06/04/2018       Social History   Substance Use Topics     Smoking status: Former Smoker     Types: Cigarettes     Smokeless tobacco: Former User     Quit date: 4/27/2018     Alcohol use No      Comment: once every 2 weeks     Family History   Problem Relation Age of Onset     HEART DISEASE Maternal Grandfather      passed at age 62, started late 40's     DIABETES Maternal Grandfather      DIABETES Father      Hypertension Father      DIABETES Paternal Grandfather      Hypertension Paternal Grandfather      Hypertension Paternal Uncle      HEART DISEASE Mother 56     3 stents     " "Hypertension Mother      HEART DISEASE Maternal Grandmother      HEART DISEASE Paternal Grandmother          Current Outpatient Prescriptions   Medication Sig Dispense Refill     FLUoxetine (PROZAC) 40 MG capsule TAKE 2 CAPSULES (80 MG) BY MOUTH DAILY 60 capsule 0     multivitamin, therapeutic (THERA-VIT) TABS Take 1 tablet by mouth daily       Omega-3 Fatty Acids (FISH OIL) 1200 MG CAPS Take 2 capsules by mouth daily       traZODone (DESYREL) 50 MG tablet Take 1-2 tablets ( mg) by mouth nightly as needed for sleep (Patient not taking: Reported on 1/11/2018) 60 tablet 1     Allergies   Allergen Reactions     Nickel Rash     BP Readings from Last 3 Encounters:   06/06/18 110/66   06/01/18 128/78   05/24/18 124/76    Wt Readings from Last 3 Encounters:   06/06/18 262 lb 3.2 oz (118.9 kg)   06/01/18 261 lb 8 oz (118.6 kg)   05/24/18 268 lb 4.8 oz (121.7 kg)         Reviewed and updated as needed this visit by clinical staff  Tobacco  Allergies  Meds  Problems  Med Hx  Surg Hx  Fam Hx  Soc Hx        Reviewed and updated as needed this visit by Provider  Allergies  Meds  Problems         ROS:  Constitutional, HEENT, cardiovascular, pulmonary, gi and gu systems are negative, except as otherwise noted.    OBJECTIVE:   /66  Pulse 102  Temp 97.9  F (36.6  C) (Oral)  Ht 5' 6\" (1.676 m)  Wt 262 lb 3.2 oz (118.9 kg)  LMP 05/18/2018 (Exact Date)  SpO2 94%  BMI 42.32 kg/m2  Body mass index is 42.32 kg/(m^2).  GENERAL: healthy, alert and no distress. Non-toxic. Sitting in reclined position in chair.  EYES: Eyes grossly normal to inspection, conjunctivae and sclerae normal  RESP: Breathing comfortably on room air  CV: Warm, well-perfused.   ABDOMEN: soft, mildly distended.  Diffuse tenderness to palpation.  No rebound or guarding.  Abdominal binder in place- removed for exam.  SKIN: Laparoscopic incisions appear well healing without drainage or erythema.    Diagnostic Test Results:  Abdominal Xray " -shows dilated colon and dilated small bowel with air-fluid level.  No obvious transition point.  Final radiology read pending.    ASSESSMENT/PLAN:   1. Abdominal pain, generalized  2. Postoperative state  3. Adynamic ileus (H)  She has been taking a large amount of narcotics and little to no bowel regimen.  Was at risk of ileus due to recent surgery as well.  Spoke with radiology who agreed the picture looked more like an ileus rather than a small bowel obstruction.  No fever or peritoneal signs to suggest perforation.   - XR Abdomen 2 Views; Future (sent on CD with the patient)  -Recommended that she proceed to [facility] (where she had her surgery) for NG tube decompression and likely admission for fluids and pain control.    Dispo: to emergency department at [facility] for further treatment of ileus.     Patsy Hays MD  [facility]    I have seen and discussed this patient with Dr. Hays and agree with joint documentation as noted above.    Aleks Valladares MD  Attending Internal Medicine/Pediatrics Physician

## 2018-06-06 NOTE — PATIENT INSTRUCTIONS
Please head over to [facility] to get admitted to the hospital due to an adynamic ileus (bowels not moving quickly).     This usually needs an NG tube to decompress them.     It was a pleasure to see you today!    Dr. Patsy Hays

## 2018-06-06 NOTE — MR AVS SNAPSHOT
After Visit Summary   6/6/2018    Tricia Eubanks    MRN: [id number]           Patient Information     Date Of Birth          1995        Visit Information        Provider Department      6/6/2018 3:30 PM Patsy Hays MD [facility]        Today's Diagnoses     Abdominal pain, generalized    -  1    Postoperative state        Adynamic ileus (H)          Care Instructions    Please head over to [facility] to get admitted to the hospital due to an adynamic ileus (bowels not moving quickly).     This usually needs an NG tube to decompress them.     It was a pleasure to see you today!    Dr. Patsy Hays            Follow-ups after your visit        Who to contact     If you have questions or need follow up information about today's clinic visit or your schedule please contact [facility] directly at [phone].  Normal or non-critical lab and imaging results will be communicated to you by [employer]®hart, letter or phone within 4 business days after the clinic has received the results. If you do not hear from us within 7 days, please contact the clinic through [employer]hart or phone. If you have a critical or abnormal lab result, we will notify you by phone as soon as possible.  Submit refill requests through [employer] or call your pharmacy and they will forward the refill request to us. Please allow 3 business days for your refill to be completed.          Additional Information About Your Visit        MyChart Information     [employer] gives you secure access to your electronic health record. If you see a primary care provider, you can also send messages to your care team and make appointments. If you have questions, please call your primary care clinic.  If you do not have a primary care provider, please call [phone] and they will assist you.        Care EveryWhere ID     This is your Care EveryWhere ID. This could be used by other organizations to access your [facility]  "records  [id number]        Your Vitals Were     Pulse Temperature Height Last Period Pulse Oximetry BMI (Body Mass Index)    102 97.9  F (36.6  C) (Oral) 5' 6\" (1.676 m) 05/18/2018 (Exact Date) 94% 42.32 kg/m2       Blood Pressure from Last 3 Encounters:   06/06/18 110/66   06/01/18 128/78   05/24/18 124/76    Weight from Last 3 Encounters:   06/06/18 262 lb 3.2 oz (118.9 kg)   06/01/18 261 lb 8 oz (118.6 kg)   05/24/18 268 lb 4.8 oz (121.7 kg)               Primary Care Provider Office Phone # Fax #    Carine Ross [phone] [address] [place] [address]        Equal Access to Services     [facility]: Hadii vonnie salomon hadasho Soomaali, waaxda luqadaha, qaybta kaalmada ademercedesyada, viviana betts . So [facility] [phone].    ATENCIÓN: Si habla español, tiene a yap disposición servicios gratuitos de asistencia lingüística. Llame al [phone].    We comply with applicable federal civil rights laws and [place] laws. We do not discriminate on the basis of race, color, national origin, age, disability, sex, sexual orientation, or gender identity.            Thank you!     Thank you for choosing [facility]  for your care. Our goal is always to provide you with excellent care. Hearing back from our patients is one way we can continue to improve our services. Please take a few minutes to complete the written survey that you may receive in the mail after your visit with us. Thank you!             Your Updated Medication List - Protect others around you: Learn how to safely use, store and throw away your medicines at www.disposemymeds.org.          This list is accurate as of 6/6/18  4:28 PM.  Always use your most recent med list.                   Brand Name Dispense Instructions for use Diagnosis    fish Oil 1200 MG capsule      Take 2 capsules by mouth daily        FLUoxetine 40 MG capsule    PROzac    60 capsule    TAKE 2 CAPSULES (80 " MG) BY MOUTH DAILY    Major depressive disorder, recurrent episode, moderate (H), Anxiety       multivitamin, therapeutic Tabs tablet      Take 1 tablet by mouth daily        traZODone 50 MG tablet    DESYREL    60 tablet    Take 1-2 tablets ( mg) by mouth nightly as needed for sleep    Primary insomnia

## 2018-06-29 ENCOUNTER — OFFICE VISIT (OUTPATIENT)
Dept: PEDIATRICS | Facility: CLINIC | Age: 23
End: 2018-06-29
Payer: COMMERCIAL

## 2018-06-29 DIAGNOSIS — Z97.5 NEXPLANON IN PLACE: ICD-10-CM

## 2018-06-29 DIAGNOSIS — Z30.017 NEXPLANON INSERTION: Primary | ICD-10-CM

## 2018-06-29 DIAGNOSIS — Z11.3 ROUTINE SCREENING FOR STI (SEXUALLY TRANSMITTED INFECTION): ICD-10-CM

## 2018-06-29 LAB
BETA HCG QUAL IFA URINE: NEGATIVE
SPECIMEN SOURCE: ABNORMAL
WET PREP SPEC: ABNORMAL

## 2018-06-29 PROCEDURE — 86780 TREPONEMA PALLIDUM: CPT | Performed by: INTERNAL MEDICINE

## 2018-06-29 PROCEDURE — 87491 CHLMYD TRACH DNA AMP PROBE: CPT | Performed by: INTERNAL MEDICINE

## 2018-06-29 PROCEDURE — 87210 SMEAR WET MOUNT SALINE/INK: CPT | Performed by: INTERNAL MEDICINE

## 2018-06-29 PROCEDURE — 99213 OFFICE O/P EST LOW 20 MIN: CPT | Mod: 25 | Performed by: INTERNAL MEDICINE

## 2018-06-29 PROCEDURE — 36415 COLL VENOUS BLD VENIPUNCTURE: CPT | Performed by: INTERNAL MEDICINE

## 2018-06-29 PROCEDURE — 84703 CHORIONIC GONADOTROPIN ASSAY: CPT | Performed by: INTERNAL MEDICINE

## 2018-06-29 PROCEDURE — 87591 N.GONORRHOEAE DNA AMP PROB: CPT | Performed by: INTERNAL MEDICINE

## 2018-06-29 PROCEDURE — 87389 HIV-1 AG W/HIV-1&-2 AB AG IA: CPT | Performed by: INTERNAL MEDICINE

## 2018-06-29 PROCEDURE — 11981 INSERTION DRUG DLVR IMPLANT: CPT | Performed by: INTERNAL MEDICINE

## 2018-06-29 NOTE — PROGRESS NOTES
SUBJECTIVE:   Tricia Eubanks is a 23 year old female who presents to clinic today for the following health issues:      1. Contraceptive management: Would like Nexplanon placed for birth control. Previously had IUD, but insertion was quite painful for her and she did not want to repeat this. Has done a lot of research on her own and settled on Nexplanon. No hx of blood clots.     2. Would like STI testing. Has had 2 male partners in past year and just wants full screening, otherwise asymptomatic with no vaginal discharge or pelvic pain.       Problem list and histories reviewed & adjusted, as indicated.  Additional history: as documented    Patient Active Problem List   Diagnosis     IUD (intrauterine device) in place     Alcohol abuse     Smoking     Hemoptysis     Major depressive disorder, recurrent episode, moderate (H)     Eczema, unspecified eczema     Morbid obesity due to excess calories (H)     Abnormal weight gain     Anxiety     Past Surgical History:   Procedure Laterality Date     LAPAROSCOPIC GASTRIC SLEEVE  06/04/2018       Social History   Substance Use Topics     Smoking status: Former Smoker     Types: Cigarettes     Smokeless tobacco: Former User     Quit date: 4/27/2018     Alcohol use No      Comment: once every 2 weeks     Family History   Problem Relation Age of Onset     HEART DISEASE Maternal Grandfather      passed at age 62, started late 40's     Diabetes Maternal Grandfather      Diabetes Father      Hypertension Father      Diabetes Paternal Grandfather      Hypertension Paternal Grandfather      Hypertension Paternal Uncle      HEART DISEASE Mother 56     3 stents     Hypertension Mother      HEART DISEASE Maternal Grandmother      HEART DISEASE Paternal Grandmother            Reviewed and updated as needed this visit by clinical staff  Tobacco  Allergies  Meds  Med Hx  Fam Hx  Soc Hx      ROS:  Constitutional, gi and gu systems are negative, except as otherwise  "noted.    OBJECTIVE:     BP (P) 104/60 (BP Location: Right arm, Patient Position: Chair, Cuff Size: Adult Large)  Pulse (P) 65  Temp (P) 97.6  F (36.4  C) (Tympanic)  Ht (P) 5' 6\" (1.676 m)  Wt (P) 246 lb 3 oz (111.7 kg)  SpO2 (P) 98%  Breastfeeding? No  BMI (P) 39.74 kg/m2  Body mass index is 39.74 kg/(m^2) (pended).  GENERAL: healthy, alert and no distress   (female): normal female external genitalia, normal urethral meatus, vaginal mucosa, normal cervix without masses. Small amount of white/gray thin discharge present    Diagnostic Test Results:  Results for orders placed or performed in visit on 06/29/18 (from the past 24 hour(s))   Beta HCG Qual, Urine - [facility] [place] ([id number])   Result Value Ref Range    Beta HCG Qual IFA Urine Negative NEG^Negative          ASSESSMENT/PLAN:     1. Nexplanon insertion  Reviewed available methods of birth control; patient elected to proceed with Nexplanon placement. Consent was reviewed and signed by patient, risks and medication side effects were reviewed. The patient's non-dominant arm (left) was used. Landmarks were identified. Insertion site was anesthetized with 2ml of 1% lidocaine with epinephrine, and site was cleaned. Using sterile technique, the device was inserted 10cm from medial epicondyle on upper arm just below triceps groove. Pressure was applied to insertion site and hemostasis was obtained. It was confirmed that device was fully inserted and both patient and myself were able to palpate inserted Nexplanon. Bandage and compression wrap applied, and patient was educated in aftercare. Patient tolerated the procedure well with minimal blood loss.       2. Routine screening for STI (sexually transmitted infection)  - HIV Antigen Antibody Combo  - Chlamydia trachomatis PCR  - Neisseria gonorrhoeae PCR  - Wet prep  - Treponema Abs w Reflex to RPR and Titer    Patient Instructions   Stop by lab for the rest of your STD testing. We will send these results " to your MyChart.    Your Nexplanon was successfully placed today. You should always be able to feel the device under your skin; you need to contact us if you can no longer feel this.    You may have some bruising and discomfort. Keep the compression wrap on for at least 24 hours. Leave the small bandaid underneath on for 3-5 days.    Let us know if you have any signs of redness, swelling or warmth that would be concerning for an infection.    Use condoms for at least 2 weeks to prevent pregnancy. Nexplanon does not prevent STDs, so always use condoms to prevent STDs.    Spotting for the first 3 months is common, this will often go away but not always.     Call with any questions!      Mónica Henry MD  [facility]

## 2018-06-29 NOTE — PATIENT INSTRUCTIONS
Stop by lab for the rest of your STD testing. We will send these results to your MyChart.    Your Nexplanon was successfully placed today. You should always be able to feel the device under your skin; you need to contact us if you can no longer feel this.    You may have some bruising and discomfort. Keep the compression wrap on for at least 24 hours. Leave the small bandaid underneath on for 3-5 days.    Let us know if you have any signs of redness, swelling or warmth that would be concerning for an infection.    Use condoms for at least 2 weeks to prevent pregnancy. Nexplanon does not prevent STDs, so always use condoms to prevent STDs.    Spotting for the first 3 months is common, this will often go away but not always.     Call with any questions!

## 2018-06-29 NOTE — MR AVS SNAPSHOT
After Visit Summary   6/29/2018    Tricia Eubanks    MRN: [id number]           Patient Information     Date Of Birth          1995        Visit Information        Provider Department      6/29/2018 12:50 PM Mónica Hernandez MD [facility]        Today's Diagnoses     Contraception    -  1    Routine screening for STI (sexually transmitted infection)          Care Instructions    Stop by lab for the rest of your STD testing. We will send these results to your MyChart.    Your Nexplanon was successfully placed today. You should always be able to feel the device under your skin; you need to contact us if you can no longer feel this.    You may have some bruising and discomfort. Keep the compression wrap on for at least 24 hours. Leave the small bandaid underneath on for 3-5 days.    Let us know if you have any signs of redness, swelling or warmth that would be concerning for an infection.    Use condoms for at least 2 weeks to prevent pregnancy. Nexplanon does not prevent STDs, so always use condoms to prevent STDs.    Spotting for the first 3 months is common, this will often go away but not always.     Call with any questions!          Follow-ups after your visit        Who to contact     If you have questions or need follow up information about today's clinic visit or your schedule please contact [facility] directly at [phone].  Normal or non-critical lab and imaging results will be communicated to you by MyChart, letter or phone within 4 business days after the clinic has received the results. If you do not hear from us within 7 days, please contact the clinic through MyChart or phone. If you have a critical or abnormal lab result, we will notify you by phone as soon as possible.  Submit refill requests through [employer] or call your pharmacy and they will forward the refill request to us. Please allow 3 business days for your refill to be completed.          Additional  Information About Your Visit        MyChart Information     [facility] gives you secure access to your electronic health record. If you see a primary care provider, you can also send messages to your care team and make appointments. If you have questions, please call your primary care clinic.  If you do not have a primary care provider, please call [phone] and they will assist you.        Care EveryWhere ID     This is your Care EveryWhere ID. This could be used by other organizations to access your [facility] medical records  [id number]        Your Vitals Were     Breastfeeding?                   No            Blood Pressure from Last 3 Encounters:   06/29/18 (P) 104/60   06/06/18 110/66   06/01/18 128/78    Weight from Last 3 Encounters:   06/29/18 (P) 246 lb 3 oz (111.7 kg)   06/06/18 262 lb 3.2 oz (118.9 kg)   06/01/18 261 lb 8 oz (118.6 kg)              We Performed the Following     Beta HCG Qual, Urine - [facility] [place] ([id number])     Chlamydia trachomatis PCR     HIV Antigen Antibody Combo     Neisseria gonorrhoeae PCR     Treponema Abs w Reflex to RPR and Titer     Wet prep        Primary Care Provider Office Phone # Fax #    Carine Ross [phone]       [facility] [place] [address]        Equal Access to Services     [facility] AH: Hadii aad ku hadasho Soomaali, waaxda luqadaha, qaybta kaalmada adeegyada, waxbrock ledbetterin haymatilde lomax. So [facility] [phone].    ATENCIÓN: Si habla español, tiene a yap disposición servicios gratuitos de asistencia lingüística. Llame al [phone].    We comply with applicable federal civil rights laws and [place] laws. We do not discriminate on the basis of race, color, national origin, age, disability, sex, sexual orientation, or gender identity.            Thank you!     Thank you for choosing [facility]  for your care. Our goal is always to provide you with excellent care. Hearing back from our  patients is one way we can continue to improve our services. Please take a few minutes to complete the written survey that you may receive in the mail after your visit with us. Thank you!             Your Updated Medication List - Protect others around you: Learn how to safely use, store and throw away your medicines at www.disposemymeds.org.          This list is accurate as of 6/29/18  1:23 PM.  Always use your most recent med list.                   Brand Name Dispense Instructions for use Diagnosis    fish Oil 1200 MG capsule      Take 2 capsules by mouth daily        FLUoxetine 40 MG capsule    PROzac    60 capsule    TAKE 2 CAPSULES (80 MG) BY MOUTH DAILY    Major depressive disorder, recurrent episode, moderate (H), Anxiety       multivitamin, therapeutic Tabs tablet      Take 1 tablet by mouth daily        traZODone 50 MG tablet    DESYREL    60 tablet    Take 1-2 tablets ( mg) by mouth nightly as needed for sleep    Primary insomnia

## 2018-06-30 LAB — T PALLIDUM AB SER QL: NONREACTIVE

## 2018-07-01 LAB
C TRACH DNA SPEC QL NAA+PROBE: POSITIVE
N GONORRHOEA DNA SPEC QL NAA+PROBE: NEGATIVE
SPECIMEN SOURCE: ABNORMAL
SPECIMEN SOURCE: NORMAL

## 2018-07-02 DIAGNOSIS — B96.89 BACTERIAL VAGINOSIS: ICD-10-CM

## 2018-07-02 DIAGNOSIS — N76.0 BACTERIAL VAGINOSIS: ICD-10-CM

## 2018-07-02 DIAGNOSIS — A74.9 CHLAMYDIA: Primary | ICD-10-CM

## 2018-07-02 LAB — HIV 1+2 AB+HIV1 P24 AG SERPL QL IA: NONREACTIVE

## 2018-07-02 RX ORDER — AZITHROMYCIN 500 MG/1
1000 TABLET, FILM COATED ORAL ONCE
Qty: 2 TABLET | Refills: 0 | Status: SHIPPED | OUTPATIENT
Start: 2018-07-02 | End: 2018-07-02

## 2018-07-02 RX ORDER — METRONIDAZOLE 500 MG/1
500 TABLET ORAL 2 TIMES DAILY
Qty: 14 TABLET | Refills: 0 | Status: SHIPPED | OUTPATIENT
Start: 2018-07-02 | End: 2018-08-27

## 2018-08-14 ENCOUNTER — TRANSFERRED RECORDS (OUTPATIENT)
Dept: HEALTH INFORMATION MANAGEMENT | Facility: CLINIC | Age: 23
End: 2018-08-14

## 2018-08-15 ENCOUNTER — TRANSFERRED RECORDS (OUTPATIENT)
Dept: HEALTH INFORMATION MANAGEMENT | Facility: CLINIC | Age: 23
End: 2018-08-15

## 2018-08-27 ENCOUNTER — OFFICE VISIT (OUTPATIENT)
Dept: PEDIATRICS | Facility: CLINIC | Age: 23
End: 2018-08-27
Payer: COMMERCIAL

## 2018-08-27 VITALS
BODY MASS INDEX: 36.09 KG/M2 | WEIGHT: 223.6 LBS | DIASTOLIC BLOOD PRESSURE: 64 MMHG | TEMPERATURE: 97.9 F | SYSTOLIC BLOOD PRESSURE: 108 MMHG | OXYGEN SATURATION: 98 % | HEART RATE: 79 BPM

## 2018-08-27 DIAGNOSIS — G47.00 INSOMNIA, UNSPECIFIED TYPE: ICD-10-CM

## 2018-08-27 DIAGNOSIS — G25.81 RESTLESS LEG SYNDROME: ICD-10-CM

## 2018-08-27 DIAGNOSIS — F41.9 ANXIETY: ICD-10-CM

## 2018-08-27 DIAGNOSIS — F60.3 BORDERLINE PERSONALITY DISORDER (H): ICD-10-CM

## 2018-08-27 DIAGNOSIS — F33.1 MAJOR DEPRESSIVE DISORDER, RECURRENT EPISODE, MODERATE (H): Primary | ICD-10-CM

## 2018-08-27 DIAGNOSIS — F43.10 PTSD (POST-TRAUMATIC STRESS DISORDER): ICD-10-CM

## 2018-08-27 LAB — HGB BLD-MCNC: 13.4 G/DL (ref 11.7–15.7)

## 2018-08-27 PROCEDURE — 36415 COLL VENOUS BLD VENIPUNCTURE: CPT | Performed by: INTERNAL MEDICINE

## 2018-08-27 PROCEDURE — 85018 HEMOGLOBIN: CPT | Performed by: INTERNAL MEDICINE

## 2018-08-27 PROCEDURE — 99214 OFFICE O/P EST MOD 30 MIN: CPT | Performed by: INTERNAL MEDICINE

## 2018-08-27 PROCEDURE — 82728 ASSAY OF FERRITIN: CPT | Performed by: INTERNAL MEDICINE

## 2018-08-27 RX ORDER — ZOLPIDEM TARTRATE 5 MG/1
5 TABLET ORAL
Qty: 30 TABLET | Refills: 1 | Status: SHIPPED | OUTPATIENT
Start: 2018-08-27 | End: 2019-09-30

## 2018-08-27 RX ORDER — BUPROPION HYDROCHLORIDE 150 MG/1
150 TABLET ORAL EVERY MORNING
Qty: 30 TABLET | Refills: 1 | Status: SHIPPED | OUTPATIENT
Start: 2018-08-27 | End: 2018-09-24

## 2018-08-27 RX ORDER — FLUOXETINE 40 MG/1
CAPSULE ORAL
Qty: 180 CAPSULE | Refills: 1 | Status: SHIPPED | OUTPATIENT
Start: 2018-08-27 | End: 2019-09-30

## 2018-08-27 ASSESSMENT — ANXIETY QUESTIONNAIRES
GAD7 TOTAL SCORE: 14
6. BECOMING EASILY ANNOYED OR IRRITABLE: SEVERAL DAYS
5. BEING SO RESTLESS THAT IT IS HARD TO SIT STILL: SEVERAL DAYS
7. FEELING AFRAID AS IF SOMETHING AWFUL MIGHT HAPPEN: SEVERAL DAYS
2. NOT BEING ABLE TO STOP OR CONTROL WORRYING: NEARLY EVERY DAY
3. WORRYING TOO MUCH ABOUT DIFFERENT THINGS: NEARLY EVERY DAY
1. FEELING NERVOUS, ANXIOUS, OR ON EDGE: NEARLY EVERY DAY

## 2018-08-27 ASSESSMENT — PATIENT HEALTH QUESTIONNAIRE - PHQ9: 5. POOR APPETITE OR OVEREATING: MORE THAN HALF THE DAYS

## 2018-08-27 NOTE — LETTER
[address] [address]                  [phone]   August 29, 2018    Tricia Eubanks  [address] [place] [id number]        Dear Tricia,    Your hemoglobin is normal. Your ferritin (iron storage) is at 74. I like to keep it over 50 with restless leg, so I don't think increasing your iron will be helpful for your symptoms. Sometimes the fluoxetine can increase restless leg as well.    Please let me know if you have any questions.    Sincerely,  Carine Ross MD      Results for orders placed or performed in visit on 08/27/18   Ferritin   Result Value Ref Range    Ferritin 74 12 - 150 ng/mL   Hemoglobin   Result Value Ref Range    Hemoglobin 13.4 11.7 - 15.7 g/dL

## 2018-08-27 NOTE — MR AVS SNAPSHOT
After Visit Summary   8/27/2018    Tricia Eubanks    MRN: [id number]           Patient Information     Date Of Birth          1995        Visit Information        Provider Department      8/27/2018 2:40 PM Carine Ross MD [facility]        Today's Diagnoses     Major depressive disorder, recurrent episode, moderate (H)    -  1    Insomnia, unspecified type        Restless leg syndrome        Anxiety          Care Instructions    1. Refilled fluoxetine - continue at 80 mg once a day  2. Start bupropion (wellbutrin) 150 mg once a day  3. Ambien as needed for sleep  4. Labs today: hemoglobin and ferritin (iron storage level)  5. Follow-up in 1 month          Follow-ups after your visit        Follow-up notes from your care team     Return in about 4 weeks (around 9/24/2018).      Your next 10 appointments already scheduled     Sep 24, 2018  9:00 AM CDT   Office Visit with Carine Ross MD   [facility]    [address] [place] [address]  [address]  [facility] [address] [phone]           Bring a current list of meds and any records pertaining to this visit. For Physicals, please bring immunization records and any forms needing to be filled out. Please arrive 10 minutes early to complete paperwork.              Who to contact     If you have questions or need follow up information about today's clinic visit or your schedule please contact [facility] directly at [phone].  Normal or non-critical lab and imaging results will be communicated to you by MyChart, letter or phone within 4 business days after the clinic has received the results. If you do not hear from us within 7 days, please contact the clinic through MyChart or phone. If you have a critical or abnormal lab result, we will notify you by phone as soon as possible.  Submit refill requests through [employer] or call your pharmacy and they will forward the  "refill request to us. Please allow 3 business days for your refill to be completed.          Additional Information About Your Visit        ShoetteharBeckonCall Information     [employer] lets you send messages to your doctor, view your test results, renew your prescriptions, schedule appointments and more. To sign up, go to [email or web address] . Click on \"Log in\" on the left side of the screen, which will take you to the Welcome page. Then click on \"Sign up Now\" on the right side of the page.     You will be asked to enter the access code listed below, as well as some personal information. Please follow the directions to create your username and password.     Your access code is: [email or web address]  Expires: 2018  3:13 PM     Your access code will  in 90 days. If you need help or a new code, please call your [facility] clinic or [phone].        Care EveryWhere ID     This is your Care EveryWhere ID. This could be used by other organizations to access your [facility] medical records  [id number]        Your Vitals Were     Pulse Temperature Pulse Oximetry BMI (Body Mass Index)          79 97.9  F (36.6  C) (Oral) 98% 36.09 kg/m2         Blood Pressure from Last 3 Encounters:   18 108/64   18 (P) 104/60   18 110/66    Weight from Last 3 Encounters:   18 223 lb 9.6 oz (101.4 kg)   18 (P) 246 lb 3 oz (111.7 kg)   18 262 lb 3.2 oz (118.9 kg)              We Performed the Following     Ferritin     Hemoglobin          Today's Medication Changes          These changes are accurate as of 18  3:13 PM.  If you have any questions, ask your nurse or doctor.               Start taking these medicines.        Dose/Directions    buPROPion 150 MG 24 hr tablet   Commonly known as:  WELLBUTRIN XL   Used for:  Major depressive disorder, recurrent episode, moderate (H)   Started by:  Carine Ross MD        Dose:  150 mg   Take 1 tablet (150 mg) by mouth every morning   Quantity:  30 " tablet   Refills:  1       zolpidem 5 MG tablet   Commonly known as:  AMBIEN   Used for:  Insomnia, unspecified type   Started by:  Carine Ross MD        Dose:  5 mg   Take 1 tablet (5 mg) by mouth nightly as needed for sleep   Quantity:  30 tablet   Refills:  1            Where to get your medicines      These medications were sent to [id number]  [address]  [address] [place] [address]     Phone:  [phone]     buPROPion 150 MG 24 hr tablet    FLUoxetine 40 MG capsule         Some of these will need a paper prescription and others can be bought over the counter.  Ask your nurse if you have questions.     Bring a paper prescription for each of these medications     zolpidem 5 MG tablet                Primary Care Provider Office Phone # Fax #    Carine Ross [phone] [address] [place] [address]        Equal Access to Services     [facility] AH: Hadii aad ku hadasho Soomaali, waaxda luqadaha, qaybta kaalmada adeegyada, waxay idiin hayaan jeanine betts . So [facility] [phone].    ATENCIÓN: Si habla español, tiene a yap disposición servicios gratuitos de asistencia lingüística. Ll[facility] [phone].    We comply with applicable federal civil rights laws and [place] laws. We do not discriminate on the basis of race, color, national origin, age, disability, sex, sexual orientation, or gender identity.            Thank you!     [facility] your [facility]. Our goal is always to provide you with excellent care. Hearing back from our patients is one way we can continue to improve our services. Please take a few minutes to complete the written survey that you may receive in the mail after your visit with us. Thank you!             Your Updated Medication List - Protect others around you: Learn how to safely use, store and throw away your medicines at www.disposemymeds.org.           This list is accurate as of 8/27/18  3:13 PM.  Always use your most recent med list.                   Brand Name Dispense Instructions for use Diagnosis    buPROPion 150 MG 24 hr tablet    WELLBUTRIN XL    30 tablet    Take 1 tablet (150 mg) by mouth every morning    Major depressive disorder, recurrent episode, moderate (H)       etonogestrel 68 MG Impl    IMPLANON/NEXPLANON    1 each    1 each (68 mg) by Subdermal route once for 1 dose    Nexplanon insertion       fish Oil 1200 MG capsule      Take 2 capsules by mouth daily        FLUoxetine 40 MG capsule    PROzac    180 capsule    TAKE 2 CAPSULES (80 MG) BY MOUTH DAILY    Major depressive disorder, recurrent episode, moderate (H), Anxiety       multivitamin, therapeutic Tabs tablet      Take 1 tablet by mouth daily        zolpidem 5 MG tablet    AMBIEN    30 tablet    Take 1 tablet (5 mg) by mouth nightly as needed for sleep    Insomnia, unspecified type

## 2018-08-27 NOTE — PATIENT INSTRUCTIONS
1. Refilled fluoxetine - continue at 80 mg once a day  2. Start bupropion (wellbutrin) 150 mg once a day  3. Ambien as needed for sleep  4. Labs today: hemoglobin and ferritin (iron storage level)  5. Follow-up in 1 month

## 2018-08-27 NOTE — PROGRESS NOTES
SUBJECTIVE:   Tricia Eubanks is a 23 year old female who presents to clinic today for the following health issues:    Hospital Follow-up Visit:    Hospital/Nursing Home/IP Rehab Facility: [place], [place]  Date of Admission: 08/15/2018  Date of Discharge: 08/20/2018  Reason(s) for Admission: attempt to suicide            Problems taking medications regularly:  None       Medication changes since discharge: None       Problems adhering to non-medication therapy:  None    Summary of hospitalization:  [facility] discharge summary reviewed  Diagnostic Tests/Treatments reviewed.  Follow up needed: none  Other Healthcare Providers Involved in Patient s Care:         psycholgoy  Update since discharge: improved.   Post Discharge Medication Reconciliation: discharge medications reconciled and changed, per note/orders (see AVS).  Plan of care communicated with patient    Patient hospitalized after suicide attempt. Attempted strangling self with necklace. No changes in medications made. Overall feels improved. No longer suicidal. Currently taking fluoxetine 80 mg once a day. Previously was on citalopram (made groggy and low libido). Has not tried other medications. Trying to get into DBT on individual basis. Working with insurance to see what is covered in her area. Drinking still on weekends. Reports drinking 3-5 beers. Reports does not drink other days and does not feel drunk when she drinks.    Sleep: having a hard time sleeping at times. Feels restless and like she has to move. Has not had iron levels checked since surgery. Taking melatonin, which helps. Usually takes 6 mg.    Gastric bypass: done in June. Has been feeling well since that time. Taking iron, multi-vitamin and B12. Losing good amount of weight.       Reviewed and updated as needed this visit by clinical staff  Tobacco  Allergies  Meds  Problems  Med Hx  Surg Hx  Fam Hx  Soc Hx        Reviewed and updated as needed this visit by  Provider  Allergies  Meds  Problems         -------------------------------------    ROS:  Constitutional, HEENT, cardiovascular, pulmonary, GI, , musculoskeletal, neuro, skin, endocrine and psych systems are negative, except as otherwise noted.    OBJECTIVE:                                                    /64 (BP Location: Right arm, Patient Position: Sitting, Cuff Size: Adult Large)  Pulse 79  Temp 97.9  F (36.6  C) (Oral)  Wt 223 lb 9.6 oz (101.4 kg)  SpO2 98%  BMI (P) 36.09 kg/m2   Body mass index is 36.09 kg/(m^2) (pended).  General Appearance: healthy, alert and no distress  Eyes:   no discharge, erythema.  Normal pupils.  Respiratory: lungs clear to auscultation - no rales, rhonchi or wheezes.  Cardiovascular: regular rate and rhythm, normal S1 S2, no S3 or S4 and no murmur, click or rub.  No peripheral edema.  Skin: no rashes or lesions.  Well perfused and normal turgor.  Psychiatric: mentation appears normal and affect normal/bright.    Diagnostic Test Results:  Results for orders placed or performed in visit on 08/27/18   Ferritin   Result Value Ref Range    Ferritin 74 12 - 150 ng/mL   Hemoglobin   Result Value Ref Range    Hemoglobin 13.4 11.7 - 15.7 g/dL        ASSESSMENT/PLAN:                                                      (F33.1) Major depressive disorder, recurrent episode, moderate (H)  (primary encounter diagnosis)  (F41.9) Anxiety  (F43.10) PTSD (post-traumatic stress disorder)  (F60.3) Borderline personality disorder  Comment: not fully controlled, no longer suicidal  Plan: FLUoxetine (PROZAC) 40 MG capsule, buPROPion         (WELLBUTRIN XL) 150 MG 24 hr tablet  - continue fluoxetine 80 mg daily  - will start bupropion 150 mg once a day - discussed possible side effects  - agree with DBT  - discussed resources if worsening symptoms  - f/u in 1 month    (G47.00) Insomnia, unspecified type  Plan: zolpidem (AMBIEN) 5 MG tablet  - will start intermittent ambien - discussed  "possible side effects.     (G25.81) Restless leg syndrome  Comment: ferritin normal. Could be worsened with SSRI  Plan: Ferritin, Hemoglobin  - if not improving, would consider trial of requip or mirapex    BMI:   Estimated body mass index is 36.09 kg/(m^2) (pended) as calculated from the following:    Height as of 6/29/18: (P) 5' 6\" (1.676 m).    Weight as of this encounter: 223 lb 9.6 oz (101.4 kg).   Weight management plan: Patient referred to endocrine and/or weight management specialty      FUTURE APPOINTMENTS:       - Follow-up visit in 1 month - scheduled    [facility]          "

## 2018-08-28 PROBLEM — F43.10 PTSD (POST-TRAUMATIC STRESS DISORDER): Status: ACTIVE | Noted: 2018-08-28

## 2018-08-28 PROBLEM — F60.3 BORDERLINE PERSONALITY DISORDER (H): Status: ACTIVE | Noted: 2018-08-28

## 2018-08-28 LAB — FERRITIN SERPL-MCNC: 74 NG/ML (ref 12–150)

## 2018-08-28 ASSESSMENT — PATIENT HEALTH QUESTIONNAIRE - PHQ9: SUM OF ALL RESPONSES TO PHQ QUESTIONS 1-9: 11

## 2018-08-28 ASSESSMENT — ANXIETY QUESTIONNAIRES: GAD7 TOTAL SCORE: 14

## 2018-09-21 NOTE — PROGRESS NOTES
SUBJECTIVE:   Tricia Eubanks is a 23 year old female who presents to clinic today for the following health issues:    Depression and Anxiety Follow-Up    Status since last visit: No change    Other associated symptoms:None    Complicating factors:     Significant life event: No     Current substance abuse: None    Has been doing weekly therapy every Monday. Feels it is helping. Provider she is seeing also does DBT groups. Next group does not start until January. Will see how she is doing when it gets closer to the next group. Added on wellbutrin about a month ago. No side effects with the wellbutrin. Feels has had some mild improvement. Continues on fluoxetine. Has some brief passive suicidal thoughts. No plan. Feels safe. Is currently looking for a new job. Has applied to over 20 different positions. Stressful, but feels doing ok with this stress.    Insomnia: Sleep is improved with Ambien. No hang over in the am.     RLS: is better with the ambien. Ferritin levels were normal.    PHQ-9 11/13/2017 5/24/2018 8/27/2018   Total Score 9 10 11   Q9: Suicide Ideation Not at all Not at all Several days     ALVARO-7 SCORE 11/13/2017 5/24/2018 8/27/2018   Total Score 14 8 14       PHQ-9  English  PHQ-9   Any Language  ALVARO-7  Suicide Assessment Five-step Evaluation and Treatment (SAFE-T)    Amount of exercise or physical activity: 2-3 days/week for an average of greater than 60 minutes    Problems taking medications regularly: No    Medication side effects: none    Diet: regular (no restrictions)    Reviewed and updated as needed this visit by clinical staff  Tobacco  Allergies  Meds  Problems  Med Hx  Surg Hx  Fam Hx  Soc Hx        Reviewed and updated as needed this visit by Provider  Allergies  Meds  Problems       -------------------------------------    ROS:  Constitutional, HEENT, cardiovascular, pulmonary, gi and gu systems are negative, except as otherwise noted.    OBJECTIVE:                                "                     /66 (BP Location: Right arm, Patient Position: Sitting, Cuff Size: Adult Regular)  Pulse 91  Temp 98.4  F (36.9  C) (Tympanic)  Ht 5' 6\" (1.676 m)  Wt 219 lb 12.8 oz (99.7 kg)  SpO2 97%  BMI 35.48 kg/m2   Body mass index is 35.48 kg/(m^2).  General Appearance: healthy, alert and no distress  Eyes:   no discharge, erythema.  Normal pupils.  Skin: no rashes or lesions.  Well perfused and normal turgor.  Psychiatric: mentation appears normal and affect normal/bright.    Diagnostic Test Results:  none      ASSESSMENT/PLAN:                                                      (F33.1) Major depressive disorder, recurrent episode, moderate (H)  (primary encounter diagnosis)  (F41.9) Anxiety  Comment: scores on PHQ9 and ALVARO-7 stable. Patient feels symptomatically improving.   Plan: buPROPion (WELLBUTRIN XL) 150 MG 24 hr tablet  - continue fluoxetine 80 mg daily  - continue bupropion 150 mg daily  - continue with weekly therapy, encouraged to consider DBT class when starts  - f/u in 3 months, sooner if needed    (G47.00) Insomnia, unspecified type  Comment: much improved  Plan:   - continue ambien - will call when needs refills    (G25.81) Restless leg syndrome  Comment: much improved with Ambien. Ferritin normal. Could be partially due to fluoxetine which she needs right now.  Plan:   - continue Ambien  - monitor for RLS symptoms    FUTURE APPOINTMENTS:       - Follow-up visit in 3 months - patient will call back to schedule    [facility]          "

## 2018-09-24 ENCOUNTER — OFFICE VISIT (OUTPATIENT)
Dept: PEDIATRICS | Facility: CLINIC | Age: 23
End: 2018-09-24
Payer: COMMERCIAL

## 2018-09-24 VITALS
WEIGHT: 219.8 LBS | TEMPERATURE: 98.4 F | OXYGEN SATURATION: 97 % | BODY MASS INDEX: 35.32 KG/M2 | HEART RATE: 91 BPM | HEIGHT: 66 IN | SYSTOLIC BLOOD PRESSURE: 106 MMHG | DIASTOLIC BLOOD PRESSURE: 66 MMHG

## 2018-09-24 DIAGNOSIS — G25.81 RESTLESS LEG SYNDROME: ICD-10-CM

## 2018-09-24 DIAGNOSIS — G47.00 INSOMNIA, UNSPECIFIED TYPE: ICD-10-CM

## 2018-09-24 DIAGNOSIS — F41.9 ANXIETY: ICD-10-CM

## 2018-09-24 DIAGNOSIS — F33.1 MAJOR DEPRESSIVE DISORDER, RECURRENT EPISODE, MODERATE (H): Primary | ICD-10-CM

## 2018-09-24 PROCEDURE — 99214 OFFICE O/P EST MOD 30 MIN: CPT | Performed by: INTERNAL MEDICINE

## 2018-09-24 RX ORDER — BUPROPION HYDROCHLORIDE 150 MG/1
150 TABLET ORAL EVERY MORNING
Qty: 90 TABLET | Refills: 1 | Status: SHIPPED | OUTPATIENT
Start: 2018-09-24 | End: 2019-09-30

## 2018-09-24 ASSESSMENT — ANXIETY QUESTIONNAIRES
GAD7 TOTAL SCORE: 12
3. WORRYING TOO MUCH ABOUT DIFFERENT THINGS: MORE THAN HALF THE DAYS
6. BECOMING EASILY ANNOYED OR IRRITABLE: SEVERAL DAYS
7. FEELING AFRAID AS IF SOMETHING AWFUL MIGHT HAPPEN: SEVERAL DAYS
2. NOT BEING ABLE TO STOP OR CONTROL WORRYING: NEARLY EVERY DAY
1. FEELING NERVOUS, ANXIOUS, OR ON EDGE: NEARLY EVERY DAY
IF YOU CHECKED OFF ANY PROBLEMS ON THIS QUESTIONNAIRE, HOW DIFFICULT HAVE THESE PROBLEMS MADE IT FOR YOU TO DO YOUR WORK, TAKE CARE OF THINGS AT HOME, OR GET ALONG WITH OTHER PEOPLE: SOMEWHAT DIFFICULT
5. BEING SO RESTLESS THAT IT IS HARD TO SIT STILL: SEVERAL DAYS

## 2018-09-24 ASSESSMENT — PATIENT HEALTH QUESTIONNAIRE - PHQ9: 5. POOR APPETITE OR OVEREATING: SEVERAL DAYS

## 2018-09-24 NOTE — PATIENT INSTRUCTIONS
1. Changed wellbutrin to 90 day supply  2. Continue fluoxetine  3. Let me know when need refills on the Ambien  4. Follow-up in 3 months

## 2018-09-24 NOTE — MR AVS SNAPSHOT
"              After Visit Summary   9/24/2018    Tricia Eubanks    MRN: [id number]           Patient Information     Date Of Birth          1995        Visit Information        Provider Department      9/24/2018 9:00 AM Carine Ross MD [facility]        Today's Diagnoses     Major depressive disorder, recurrent episode, moderate (H)          Care Instructions    1. Changed wellbutrin to 90 day supply  2. Continue fluoxetine  3. Let me know when need refills on the Ambien  4. Follow-up in 3 months            Follow-ups after your visit        Follow-up notes from your care team     Return in about 3 months (around 12/24/2018).      Who to contact     If you have questions or need follow up information about today's clinic visit or your schedule please contact [facility] directly at [phone].  Normal or non-critical lab and imaging results will be communicated to you by MyChart, letter or phone within 4 business days after the clinic has received the results. If you do not hear from us within 7 days, please contact the clinic through MyChart or phone. If you have a critical or abnormal lab result, we will notify you by phone as soon as possible.  Submit refill requests through [employer] or call your pharmacy and they will forward the refill request to us. Please allow 3 business days for your refill to be completed.          Additional Information About Your Visit        MyChart Information     [employer] lets you send messages to your doctor, view your test results, renew your prescriptions, schedule appointments and more. To sign up, go to [email or web address] . Click on \"Log in\" on the left side of the screen, which will take you to the Welcome page. Then click on \"Sign up Now\" on the right side of the page.     You will be asked to enter the access code listed below, as well as some personal information. Please follow the directions to create your username and " "password.     Your access code is: [email or web address]  Expires: 2018  3:13 PM     Your access code will  in 90 days. If you need help or a new code, please call your [place] clinic or [phone].        Care EveryWhere ID     This is your Care EveryWhere ID. This could be used by other organizations to access your [place] medical records  [id number]        Your Vitals Were     Pulse Temperature Height Pulse Oximetry BMI (Body Mass Index)       91 98.4  F (36.9  C) (Tympanic) 5' 6\" (1.676 m) 97% 35.48 kg/m2        Blood Pressure from Last 3 Encounters:   18 106/66   18 108/64   18 (P) 104/60    Weight from Last 3 Encounters:   18 219 lb 12.8 oz (99.7 kg)   18 223 lb 9.6 oz (101.4 kg)   18 (P) 246 lb 3 oz (111.7 kg)              Today, you had the following     No orders found for display         Where to get your medicines      These medications were sent to [facility] - [place], [place] - [address]  [address] [address]     Phone:  [phone]     buPROPion 150 MG 24 hr tablet          Primary Care Provider Office Phone # Fax #    Carine Ross [phone] [address] [place] [address]        Equal Access to Services     [facility] AH: Hadii aad ku hadasho Soomaali, waaxda luqadaha, qaybta kaalmada adeegyada, viviana betts . So [facility] [phone].    ATENCIÓN: Si habla español, tiene a yap disposición servicios gratuitos de asistencia lingüística. Llame al [phone].    We comply with applicable federal civil rights laws and [place] laws. We do not discriminate on the basis of race, color, national origin, age, disability, sex, sexual orientation, or gender identity.            Thank you!     Thank you for choosing [facility]  for your care. Our goal is always to provide you with excellent care. Hearing back from our patients is one way we can " continue to improve our services. Please take a few minutes to complete the written survey that you may receive in the mail after your visit with us. Thank you!             Your Updated Medication List - Protect others around you: Learn how to safely use, store and throw away your medicines at www.disposemymeds.org.          This list is accurate as of 9/24/18  9:18 AM.  Always use your most recent med list.                   Brand Name Dispense Instructions for use Diagnosis    buPROPion 150 MG 24 hr tablet    WELLBUTRIN XL    90 tablet    Take 1 tablet (150 mg) by mouth every morning    Major depressive disorder, recurrent episode, moderate (H)       etonogestrel 68 MG Impl    IMPLANON/NEXPLANON    1 each    1 each (68 mg) by Subdermal route once for 1 dose    Nexplanon insertion       fish Oil 1200 MG capsule      Take 2 capsules by mouth daily        FLUoxetine 40 MG capsule    PROzac    180 capsule    TAKE 2 CAPSULES (80 MG) BY MOUTH DAILY    Major depressive disorder, recurrent episode, moderate (H), Anxiety       multivitamin, therapeutic Tabs tablet      Take 1 tablet by mouth daily        zolpidem 5 MG tablet    AMBIEN    30 tablet    Take 1 tablet (5 mg) by mouth nightly as needed for sleep    Insomnia, unspecified type

## 2018-09-25 ASSESSMENT — PATIENT HEALTH QUESTIONNAIRE - PHQ9: SUM OF ALL RESPONSES TO PHQ QUESTIONS 1-9: 13

## 2018-09-25 ASSESSMENT — ANXIETY QUESTIONNAIRES: GAD7 TOTAL SCORE: 12

## 2019-09-30 ENCOUNTER — OFFICE VISIT (OUTPATIENT)
Dept: PEDIATRICS | Facility: CLINIC | Age: 24
End: 2019-09-30
Payer: COMMERCIAL

## 2019-09-30 VITALS
BODY MASS INDEX: 34.58 KG/M2 | TEMPERATURE: 98.4 F | HEART RATE: 94 BPM | OXYGEN SATURATION: 99 % | WEIGHT: 215.2 LBS | HEIGHT: 66 IN | DIASTOLIC BLOOD PRESSURE: 68 MMHG | SYSTOLIC BLOOD PRESSURE: 102 MMHG

## 2019-09-30 DIAGNOSIS — F10.21 ALCOHOL DEPENDENCE IN REMISSION (H): ICD-10-CM

## 2019-09-30 DIAGNOSIS — G47.00 INSOMNIA, UNSPECIFIED TYPE: ICD-10-CM

## 2019-09-30 DIAGNOSIS — F33.1 MAJOR DEPRESSIVE DISORDER, RECURRENT EPISODE, MODERATE (H): Primary | ICD-10-CM

## 2019-09-30 DIAGNOSIS — F41.9 ANXIETY: ICD-10-CM

## 2019-09-30 DIAGNOSIS — Z23 NEED FOR PROPHYLACTIC VACCINATION AND INOCULATION AGAINST INFLUENZA: ICD-10-CM

## 2019-09-30 PROCEDURE — 99214 OFFICE O/P EST MOD 30 MIN: CPT | Mod: 25 | Performed by: INTERNAL MEDICINE

## 2019-09-30 PROCEDURE — 80053 COMPREHEN METABOLIC PANEL: CPT | Performed by: INTERNAL MEDICINE

## 2019-09-30 PROCEDURE — 36415 COLL VENOUS BLD VENIPUNCTURE: CPT | Performed by: INTERNAL MEDICINE

## 2019-09-30 PROCEDURE — 90471 IMMUNIZATION ADMIN: CPT | Performed by: INTERNAL MEDICINE

## 2019-09-30 PROCEDURE — 90686 IIV4 VACC NO PRSV 0.5 ML IM: CPT | Performed by: INTERNAL MEDICINE

## 2019-09-30 RX ORDER — NALTREXONE HYDROCHLORIDE 50 MG/1
50 TABLET, FILM COATED ORAL DAILY
Qty: 34 TABLET | Refills: 3 | Status: SHIPPED | OUTPATIENT
Start: 2019-09-30 | End: 2019-11-22

## 2019-09-30 RX ORDER — BUPROPION HYDROCHLORIDE 150 MG/1
150 TABLET ORAL EVERY MORNING
Qty: 30 TABLET | Refills: 3 | Status: SHIPPED | OUTPATIENT
Start: 2019-09-30 | End: 2019-11-22

## 2019-09-30 RX ORDER — FLUOXETINE 40 MG/1
CAPSULE ORAL
Qty: 60 CAPSULE | Refills: 3 | Status: SHIPPED | OUTPATIENT
Start: 2019-09-30 | End: 2019-11-22

## 2019-09-30 RX ORDER — ZOLPIDEM TARTRATE 5 MG/1
5 TABLET ORAL
Qty: 30 TABLET | Refills: 0 | Status: SHIPPED | OUTPATIENT
Start: 2019-09-30

## 2019-09-30 ASSESSMENT — ANXIETY QUESTIONNAIRES
IF YOU CHECKED OFF ANY PROBLEMS ON THIS QUESTIONNAIRE, HOW DIFFICULT HAVE THESE PROBLEMS MADE IT FOR YOU TO DO YOUR WORK, TAKE CARE OF THINGS AT HOME, OR GET ALONG WITH OTHER PEOPLE: SOMEWHAT DIFFICULT
5. BEING SO RESTLESS THAT IT IS HARD TO SIT STILL: NOT AT ALL
3. WORRYING TOO MUCH ABOUT DIFFERENT THINGS: MORE THAN HALF THE DAYS
6. BECOMING EASILY ANNOYED OR IRRITABLE: MORE THAN HALF THE DAYS
7. FEELING AFRAID AS IF SOMETHING AWFUL MIGHT HAPPEN: SEVERAL DAYS
1. FEELING NERVOUS, ANXIOUS, OR ON EDGE: SEVERAL DAYS
GAD7 TOTAL SCORE: 10
2. NOT BEING ABLE TO STOP OR CONTROL WORRYING: NEARLY EVERY DAY

## 2019-09-30 ASSESSMENT — PATIENT HEALTH QUESTIONNAIRE - PHQ9
SUM OF ALL RESPONSES TO PHQ QUESTIONS 1-9: 18
5. POOR APPETITE OR OVEREATING: SEVERAL DAYS

## 2019-09-30 ASSESSMENT — MIFFLIN-ST. JEOR: SCORE: 1732.89

## 2019-09-30 NOTE — PROGRESS NOTES
Subjective     Tricia Eubanks is a 24 year old female who presents to clinic today for the following health issues:    HPI   Depression and Anxiety Follow-Up    How are you doing with your depression since your last visit? No change    How are you doing with your anxiety since your last visit?  No change    Are you having other symptoms that might be associated with depression or anxiety? No    Have you had a significant life event? OTHER: recent sobriety     Do you have any concerns with your use of alcohol or other drugs? Yes:  recent sobriety, would like to talk about starting antabuse     25 y/o F with longstanding history of anxiety, depression and alcoholism. Has tried to stop in the past, but always returns to drinking. Stopped drinking 35 days ago. Had a suicide attempt the week before - took a bunch of aspirin while drinking. Was seen in the ED at [facility], monitored overnight and then discharged on usual medications. Did a 28 day inpatient stay starting 8/26.  Now in an intense outpatient treatment program at [facility] in [place] started last Tuesday. Wants this to be a successful treatment and be the last time she quits drinking. Having some issue with sleep. Has a hard time falling asleep. Uses ambien only when really bad. Doesn't use very often. Feels bupropion and fluoxetine were not able to fully help her in the past with the drinking. Would like to give it more time and see how things go as she gets further into sobriety. Was started on naltrexone in the hospital. They had prescribed her antabuse, but was unable to fill it in [place] where she did her treatment. They told her that her primary could prescribe this. Naltrexone helps with her cravings to some degree, but still has them.    Started back to work last Thursday. Missed 3.5 weeks. First day missed was 8/26, first day back was 9/26. Going ok to far.     Social History     Tobacco Use     Smoking status: Former Smoker     Types:  "Cigarettes     Smokeless tobacco: Former User     Quit date: 4/27/2018   Substance Use Topics     Alcohol use: No     Alcohol/week: 0.0 standard drinks     Comment: once every 2 weeks     Drug use: No     PHQ 5/24/2018 8/27/2018 9/24/2018   PHQ-9 Total Score 10 11 13   Q9: Thoughts of better off dead/self-harm past 2 weeks Not at all Several days Several days     ALVARO-7 SCORE 5/24/2018 8/27/2018 9/24/2018   Total Score 8 14 12     No flowsheet data found.  No flowsheet data found.      Suicide Assessment Five-step Evaluation and Treatment (SAFE-T)      How many servings of fruits and vegetables do you eat daily?  2-3    On average, how many sweetened beverages do you drink each day (soda, juice, sweet tea, etc)?   1  How many days per week do you miss taking your medication? 1    What makes it hard for you to take your medications?  remembering to take    Reviewed and updated as needed this visit by Provider       Review of Systems   ROS COMP: Constitutional, HEENT, cardiovascular, pulmonary, GI, , musculoskeletal, neuro, skin, endocrine and psych systems are negative, except as otherwise noted.      Objective    /68 (BP Location: Right arm, Patient Position: Sitting, Cuff Size: Adult Regular)   Pulse 94   Temp 98.4  F (36.9  C) (Tympanic)   Ht 1.676 m (5' 6\")   Wt 97.6 kg (215 lb 3.2 oz)   SpO2 99%   BMI 34.73 kg/m    Body mass index is 34.73 kg/m .  Physical Exam   GENERAL: healthy, alert and no distress  RESP: lungs clear to auscultation - no rales, rhonchi or wheezes  CV: regular rate and rhythm, normal S1 S2, no S3 or S4, no murmur, click or rub, no peripheral edema and peripheral pulses strong  PSYCH: mentation appears normal, affect normal/bright    Diagnostic Test Results:  Labs reviewed in Epic        Assessment & Plan     1. Major depressive disorder, recurrent episode, moderate (H)  Not controlled. Working through sobriety. Not currently suicidal, but with recent suicide attempt. Currently at " "intense outpatient treatment. Would like to continue current meds and see impact further into sobriety. Will recheck in November.  - buPROPion (WELLBUTRIN XL) 150 MG 24 hr tablet; Take 1 tablet (150 mg) by mouth every morning  Dispense: 30 tablet; Refill: 3  - FLUoxetine (PROZAC) 40 MG capsule; TAKE 2 CAPSULES (80 MG) BY MOUTH DAILY  Dispense: 60 capsule; Refill: 3    2. Anxiety  Not fully controlled, but recently sober. Continue intense outpatient treatment and recheck in November.  - FLUoxetine (PROZAC) 40 MG capsule; TAKE 2 CAPSULES (80 MG) BY MOUTH DAILY  Dispense: 60 capsule; Refill: 3    3. Insomnia, unspecified type  Difficulty with sleep with sobriety. Discussed sleep hygiene. Will use ambien only if severe symptoms.  - zolpidem (AMBIEN) 5 MG tablet; Take 1 tablet (5 mg) by mouth nightly as needed for sleep  Dispense: 30 tablet; Refill: 0    4. Alcohol dependence in remission (H)  Has been sober for 35 days. Completed inpatient treatment and now in intense outpatient treatment program. Will continue naltrexone. Can take an extra 50 mg 1-2 times a week if have times with increased cravings. Discussed antabuse typically not prescribed by primary care and it is my understanding that most effective if given as directly observed therapy. She will discuss with current treatment program.   - naltrexone (DEPADE/REVIA) 50 MG tablet; Take 1 tablet (50 mg) by mouth daily May take an extra pill 1-2 days a week.  Dispense: 34 tablet; Refill: 3  - Comprehensive metabolic panel    5. Need for prophylactic vaccination and inoculation against influenza  - INFLUENZA VACCINE IM > 6 MONTHS VALENT IIV4 [20074]  - Vaccine Administration, Initial [44665]     BMI:   Estimated body mass index is 34.73 kg/m  as calculated from the following:    Height as of this encounter: 1.676 m (5' 6\").    Weight as of this encounter: 97.6 kg (215 lb 3.2 oz).   Weight management plan: Discussed healthy diet and exercise guidelines      Return in " about 7 weeks (around 11/18/2019).    Carine Ross MD  [facility]

## 2019-09-30 NOTE — PATIENT INSTRUCTIONS
1. Flu shot today  2. Continue naltrexone - can increase to 100 mg 1-2 times a week  3. Refilled fluoxetine and bupropion  4. Talk to treatment about the Antabuse  5. Labs today: liver function

## 2019-09-30 NOTE — LETTER
[facility]  [address] [address]                  [phone]   October 2, 2019    Trciia Eubanks  [address] [place] [id number]      Dear Tricia,    Here is a summary of your recent test results:    Your electrolytes, liver and kidney function are all normal.    Please contact me if you have any questions.           Thank you very much for choosing [facility]    Best regards,    Carine Ross MD      Results for orders placed or performed in visit on 09/30/19   Comprehensive metabolic panel   Result Value Ref Range    Sodium 137 133 - 144 mmol/L    Potassium 4.3 3.4 - 5.3 mmol/L    Chloride 105 94 - 109 mmol/L    Carbon Dioxide 24 20 - 32 mmol/L    Anion Gap 8 3 - 14 mmol/L    Glucose 80 70 - 99 mg/dL    Urea Nitrogen 8 7 - 30 mg/dL    Creatinine 0.60 0.52 - 1.04 mg/dL    GFR Estimate >90 >60 mL/min/[1.73_m2]    GFR Estimate If Black >90 >60 mL/min/[1.73_m2]    Calcium 8.7 8.5 - 10.1 mg/dL    Bilirubin Total 0.2 0.2 - 1.3 mg/dL    Albumin 3.7 3.4 - 5.0 g/dL    Protein Total 6.9 6.8 - 8.8 g/dL    Alkaline Phosphatase 71 40 - 150 U/L    ALT 17 0 - 50 U/L    AST 10 0 - 45 U/L

## 2019-10-01 LAB
ALBUMIN SERPL-MCNC: 3.7 G/DL (ref 3.4–5)
ALP SERPL-CCNC: 71 U/L (ref 40–150)
ALT SERPL W P-5'-P-CCNC: 17 U/L (ref 0–50)
ANION GAP SERPL CALCULATED.3IONS-SCNC: 8 MMOL/L (ref 3–14)
AST SERPL W P-5'-P-CCNC: 10 U/L (ref 0–45)
BILIRUB SERPL-MCNC: 0.2 MG/DL (ref 0.2–1.3)
BUN SERPL-MCNC: 8 MG/DL (ref 7–30)
CALCIUM SERPL-MCNC: 8.7 MG/DL (ref 8.5–10.1)
CHLORIDE SERPL-SCNC: 105 MMOL/L (ref 94–109)
CO2 SERPL-SCNC: 24 MMOL/L (ref 20–32)
CREAT SERPL-MCNC: 0.6 MG/DL (ref 0.52–1.04)
GFR SERPL CREATININE-BSD FRML MDRD: >90 ML/MIN/{1.73_M2}
GLUCOSE SERPL-MCNC: 80 MG/DL (ref 70–99)
POTASSIUM SERPL-SCNC: 4.3 MMOL/L (ref 3.4–5.3)
PROT SERPL-MCNC: 6.9 G/DL (ref 6.8–8.8)
SODIUM SERPL-SCNC: 137 MMOL/L (ref 133–144)

## 2019-10-01 ASSESSMENT — ANXIETY QUESTIONNAIRES: GAD7 TOTAL SCORE: 10

## 2019-10-31 ENCOUNTER — TRANSFERRED RECORDS (OUTPATIENT)
Dept: HEALTH INFORMATION MANAGEMENT | Facility: CLINIC | Age: 24
End: 2019-10-31

## 2019-10-31 LAB
ALT SERPL-CCNC: 13 IU/L (ref 8–45)
AST SERPL-CCNC: 13 IU/L (ref 2–40)
CREAT SERPL-MCNC: 0.71 MG/DL (ref 0.57–1.11)
GFR SERPL CREATININE-BSD FRML MDRD: >60 ML/MIN/1.73M2
GLUCOSE SERPL-MCNC: 120 MG/DL (ref 65–100)
POTASSIUM SERPL-SCNC: 4 MMOL/L (ref 3.5–5)

## 2019-11-21 NOTE — PROGRESS NOTES
Subjective     Tricia Eubanks is a 24 year old female who presents to clinic today for the following health issues:    HPI   Depression Followup    How are you doing with your depression since your last visit? Improved     Are you having other symptoms that might be associated with depression? No    Have you had a significant life event?  OTHER: sobriety 90 days     Are you feeling anxious or having panic attacks?   Yes:  anxiety    Do you have any concerns with your use of alcohol or other drugs? No     Continues in ETOH treatment program. Plans for a total of 6 months. Has about 4 months left. Has been sober for 90 days. Have a new set of girls in treatment that are not very interested in sobriety, which makes it a little more difficult. Also continues to work. Will transition into a program that is less days a week and then have follow-up afterwards as well. Feels depression is improved. At last visit, decided to continue previous medications to see if they work better without drinking and also started her on naltrexone to help with sobriety. No side effects from naltrexone. Feels depression improved.    Sleep: has been using benadryl 25 mg and melatonin 6 mg each night. Only used the ambien once. Helped her sleep, but almost made her feel drunk. Overall, sleeping better.     Ruptured ovarian cyst: seen in ED on 10/31 with ruptured ovarian cyst. Pain resolved after a few days. Was told to follow-up with GYN, but has not done so yet.    Social History     Tobacco Use     Smoking status: Former Smoker     Types: Cigarettes     Smokeless tobacco: Former User     Quit date: 4/27/2018   Substance Use Topics     Alcohol use: No     Alcohol/week: 0.0 standard drinks     Comment: once every 2 weeks     Drug use: No     PHQ 9/24/2018 9/30/2019 11/22/2019   PHQ-9 Total Score 13 18 15   Q9: Thoughts of better off dead/self-harm past 2 weeks Several days Not at all Not at all     ALVARO-7 SCORE 9/24/2018 9/30/2019 11/22/2019    Total Score 12 10 17     Last PHQ-9 11/22/2019   1.  Little interest or pleasure in doing things 1   2.  Feeling down, depressed, or hopeless 1   3.  Trouble falling or staying asleep, or sleeping too much 3   4.  Feeling tired or having little energy 3   5.  Poor appetite or overeating 2   6.  Feeling bad about yourself 0   7.  Trouble concentrating 3   8.  Moving slowly or restless 2   Q9: Thoughts of better off dead/self-harm past 2 weeks 0   PHQ-9 Total Score 15   Difficulty at work, home, or with people Somewhat difficult     AVLARO-7  11/22/2019   1. Feeling nervous, anxious, or on edge 3   2. Not being able to stop or control worrying 3   3. Worrying too much about different things 3   4. Trouble relaxing 1   5. Being so restless that it is hard to sit still 2   6. Becoming easily annoyed or irritable 3   7. Feeling afraid, as if something awful might happen 2   ALVARO-7 Total Score 17   If you checked any problems, how difficult have they made it for you to do your work, take care of things at home, or get along with other people? Somewhat difficult         Suicide Assessment Five-step Evaluation and Treatment (SAFE-T)      How many servings of fruits and vegetables do you eat daily?  0-1    On average, how many sweetened beverages do you drink each day (soda, juice, sweet tea, etc)?   0  How many days per week do you miss taking your medication? 1    What makes it hard for you to take your medications?  remembering to take    -------------------------------------    Patient Active Problem List   Diagnosis     Alcohol abuse     Smoking     Hemoptysis     Major depressive disorder, recurrent episode, moderate (H)     Eczema, unspecified eczema     Morbid obesity due to excess calories (H)     Abnormal weight gain     Anxiety     Nexplanon in place     PTSD (post-traumatic stress disorder)     Borderline personality disorder (H)     Past Surgical History:   Procedure Laterality Date     C LAPAROSCOPIC SHALONDA EN Y  "GASTROJEJUNOSTOMY  06/04/2018       Social History     Tobacco Use     Smoking status: Former Smoker     Types: Cigarettes     Smokeless tobacco: Former User     Quit date: 4/27/2018   Substance Use Topics     Alcohol use: No     Alcohol/week: 0.0 standard drinks     Comment: once every 2 weeks     Family History   Problem Relation Age of Onset     Heart Disease Maternal Grandfather         passed at age 62, started late 40's     Diabetes Maternal Grandfather      Diabetes Father      Hypertension Father      Diabetes Paternal Grandfather      Hypertension Paternal Grandfather      Hypertension Paternal Uncle      Heart Disease Mother 56        3 stents     Hypertension Mother      Heart Disease Maternal Grandmother      Heart Disease Paternal Grandmother            Reviewed and updated as needed this visit by Provider  Tobacco  Allergies  Meds  Problems  Med Hx  Surg Hx  Fam Hx         Review of Systems   ROS COMP: Constitutional, HEENT, cardiovascular, pulmonary, gi and gu systems are negative, except as otherwise noted.      Objective    BP 94/66 (BP Location: Right arm, Patient Position: Sitting, Cuff Size: Adult Regular)   Pulse 81   Temp 98.8  F (37.1  C) (Tympanic)   Ht 1.676 m (5' 6\")   Wt 96 kg (211 lb 11.2 oz)   SpO2 99%   BMI 34.17 kg/m    Body mass index is 34.17 kg/m .  Physical Exam   GENERAL: healthy, alert and no distress  EYES: Eyes grossly normal to inspection, PERRL and conjunctivae and sclerae normal  PSYCH: mentation appears normal, affect normal/bright    Diagnostic Test Results:  Labs reviewed in Epic        Assessment & Plan     1. Major depressive disorder, recurrent episode, moderate (H)  2. Anxiety  Improved. Continue fluoxetine 80 mg once a day and bupropion 150 mg once a day. Will continue in treatment program. Recommend follow-up in about 4 months as finishes up the program.  - FLUoxetine (PROZAC) 40 MG capsule; TAKE 2 CAPSULES (80 MG) BY MOUTH DAILY  Dispense: 60 capsule; " "Refill: 5  - buPROPion (WELLBUTRIN XL) 150 MG 24 hr tablet; Take 1 tablet (150 mg) by mouth every morning  Dispense: 30 tablet; Refill: 5    3. Alcohol dependence in remission (H)  Continues to be sober and in treatment program. Continue naltrexone. Had normal LFTs when in ER on 10/31, so will not repeat today.  - naltrexone (DEPADE/REVIA) 50 MG tablet; Take 1 tablet (50 mg) by mouth daily May take an extra pill 1-2 days a week.  Dispense: 34 tablet; Refill: 5    4. Cyst of right ovary  Pain resolved. Still with 2 residual cysts per pelvic ultrasound. Will schedule follow-up with GYN.       BMI:   Estimated body mass index is 34.17 kg/m  as calculated from the following:    Height as of this encounter: 1.676 m (5' 6\").    Weight as of this encounter: 96 kg (211 lb 11.2 oz).   Weight management plan: Discussed healthy diet and exercise guidelines    Return in about 4 months (around 3/22/2020).    Carine Ross MD  [facility]    "

## 2019-11-22 ENCOUNTER — OFFICE VISIT (OUTPATIENT)
Dept: PEDIATRICS | Facility: CLINIC | Age: 24
End: 2019-11-22
Payer: COMMERCIAL

## 2019-11-22 VITALS
OXYGEN SATURATION: 99 % | TEMPERATURE: 98.8 F | DIASTOLIC BLOOD PRESSURE: 66 MMHG | HEART RATE: 81 BPM | SYSTOLIC BLOOD PRESSURE: 94 MMHG | HEIGHT: 66 IN | BODY MASS INDEX: 34.02 KG/M2 | WEIGHT: 211.7 LBS

## 2019-11-22 DIAGNOSIS — F41.9 ANXIETY: ICD-10-CM

## 2019-11-22 DIAGNOSIS — N83.201 CYST OF RIGHT OVARY: ICD-10-CM

## 2019-11-22 DIAGNOSIS — F10.21 ALCOHOL DEPENDENCE IN REMISSION (H): ICD-10-CM

## 2019-11-22 DIAGNOSIS — F33.1 MAJOR DEPRESSIVE DISORDER, RECURRENT EPISODE, MODERATE (H): Primary | ICD-10-CM

## 2019-11-22 PROCEDURE — 99214 OFFICE O/P EST MOD 30 MIN: CPT | Performed by: INTERNAL MEDICINE

## 2019-11-22 RX ORDER — BUPROPION HYDROCHLORIDE 150 MG/1
150 TABLET ORAL EVERY MORNING
Qty: 30 TABLET | Refills: 5 | Status: SHIPPED | OUTPATIENT
Start: 2019-11-22

## 2019-11-22 RX ORDER — NALTREXONE HYDROCHLORIDE 50 MG/1
50 TABLET, FILM COATED ORAL DAILY
Qty: 34 TABLET | Refills: 5 | Status: SHIPPED | OUTPATIENT
Start: 2019-11-22

## 2019-11-22 RX ORDER — FLUOXETINE 40 MG/1
CAPSULE ORAL
Qty: 60 CAPSULE | Refills: 5 | Status: SHIPPED | OUTPATIENT
Start: 2019-11-22

## 2019-11-22 ASSESSMENT — ANXIETY QUESTIONNAIRES
GAD7 TOTAL SCORE: 17
2. NOT BEING ABLE TO STOP OR CONTROL WORRYING: NEARLY EVERY DAY
6. BECOMING EASILY ANNOYED OR IRRITABLE: NEARLY EVERY DAY
1. FEELING NERVOUS, ANXIOUS, OR ON EDGE: NEARLY EVERY DAY
IF YOU CHECKED OFF ANY PROBLEMS ON THIS QUESTIONNAIRE, HOW DIFFICULT HAVE THESE PROBLEMS MADE IT FOR YOU TO DO YOUR WORK, TAKE CARE OF THINGS AT HOME, OR GET ALONG WITH OTHER PEOPLE: SOMEWHAT DIFFICULT
3. WORRYING TOO MUCH ABOUT DIFFERENT THINGS: NEARLY EVERY DAY
7. FEELING AFRAID AS IF SOMETHING AWFUL MIGHT HAPPEN: MORE THAN HALF THE DAYS
5. BEING SO RESTLESS THAT IT IS HARD TO SIT STILL: MORE THAN HALF THE DAYS

## 2019-11-22 ASSESSMENT — MIFFLIN-ST. JEOR: SCORE: 1717.01

## 2019-11-22 ASSESSMENT — PATIENT HEALTH QUESTIONNAIRE - PHQ9
SUM OF ALL RESPONSES TO PHQ QUESTIONS 1-9: 15
5. POOR APPETITE OR OVEREATING: SEVERAL DAYS

## 2019-11-22 NOTE — PATIENT INSTRUCTIONS
1. Refilled medications  2. Schedule follow-up with GYN for cyst  3. Follow-up in about 4 months    ------------------  I will be changing clinics to the [facility] in [place]. My last day will be 11/27/2019. My first day there will be 12/16  [address]; [place] [address]  Phone: [phone]    It has been my pleasure to provide your care over the last few years!    Sincerely,  Carine Ross MD  Internal Medicine/Pediatrics

## 2019-11-23 ASSESSMENT — ANXIETY QUESTIONNAIRES: GAD7 TOTAL SCORE: 17
